# Patient Record
Sex: FEMALE | Race: WHITE | NOT HISPANIC OR LATINO | Employment: OTHER | URBAN - METROPOLITAN AREA
[De-identification: names, ages, dates, MRNs, and addresses within clinical notes are randomized per-mention and may not be internally consistent; named-entity substitution may affect disease eponyms.]

---

## 2017-07-31 ENCOUNTER — OFFICE VISIT (OUTPATIENT)
Dept: URGENT CARE | Facility: CLINIC | Age: 37
End: 2017-07-31
Payer: COMMERCIAL

## 2017-07-31 VITALS
OXYGEN SATURATION: 98 % | SYSTOLIC BLOOD PRESSURE: 132 MMHG | WEIGHT: 175 LBS | BODY MASS INDEX: 27.47 KG/M2 | RESPIRATION RATE: 18 BRPM | TEMPERATURE: 98 F | HEIGHT: 67 IN | HEART RATE: 85 BPM | DIASTOLIC BLOOD PRESSURE: 88 MMHG

## 2017-07-31 DIAGNOSIS — J30.9 ACUTE ALLERGIC RHINITIS, UNSPECIFIED SEASONALITY, UNSPECIFIED TRIGGER: ICD-10-CM

## 2017-07-31 DIAGNOSIS — J00 ACUTE NASOPHARYNGITIS: Primary | ICD-10-CM

## 2017-07-31 PROCEDURE — 96372 THER/PROPH/DIAG INJ SC/IM: CPT | Mod: S$GLB,,, | Performed by: NURSE PRACTITIONER

## 2017-07-31 PROCEDURE — 99204 OFFICE O/P NEW MOD 45 MIN: CPT | Mod: 25,S$GLB,, | Performed by: NURSE PRACTITIONER

## 2017-07-31 RX ORDER — BETAMETHASONE SODIUM PHOSPHATE AND BETAMETHASONE ACETATE 3; 3 MG/ML; MG/ML
6 INJECTION, SUSPENSION INTRA-ARTICULAR; INTRALESIONAL; INTRAMUSCULAR; SOFT TISSUE
Status: COMPLETED | OUTPATIENT
Start: 2017-07-31 | End: 2017-07-31

## 2017-07-31 RX ADMIN — BETAMETHASONE SODIUM PHOSPHATE AND BETAMETHASONE ACETATE 6 MG: 3; 3 INJECTION, SUSPENSION INTRA-ARTICULAR; INTRALESIONAL; INTRAMUSCULAR; SOFT TISSUE at 05:07

## 2017-07-31 NOTE — PROGRESS NOTES
"Subjective:       Patient ID: Oneida Cabrera is a 37 y.o. female.    Vitals:  height is 5' 7" (1.702 m) and weight is 79.4 kg (175 lb). Her temperature is 98 °F (36.7 °C). Her blood pressure is 132/88 and her pulse is 85. Her respiration is 18 and oxygen saturation is 98%.     Chief Complaint: Sinus Problem (2 days)    C/o waking up Saturday night with postnasal drip and then began to c/o intermittently productive cough, hoarse voice, sinus pressure, runny nose, watery eyes, itchy throat, sneezing, and fatigue.  No one else is at home sick.  Denies fever.  Sudafed and Muccinex taken with some relief.  Cough is not worse at night.      Sinus Problem   This is a new problem. The current episode started in the past 7 days. The problem has been gradually worsening since onset. There has been no fever. Associated symptoms include congestion, coughing, headaches (head pressure), a hoarse voice, sinus pressure and sneezing. Pertinent negatives include no chills, ear pain, neck pain, shortness of breath, sore throat or swollen glands. Treatments tried: sudafed. The treatment provided mild relief.     Review of Systems   Constitution: Positive for malaise/fatigue. Negative for chills, fever and night sweats.   HENT: Positive for congestion, headaches (head pressure), hoarse voice, sinus pressure and sneezing. Negative for ear pain and sore throat.    Eyes: Negative for discharge and redness.   Cardiovascular: Negative for chest pain, dyspnea on exertion and leg swelling.   Respiratory: Positive for cough and sputum production. Negative for hemoptysis, shortness of breath, sleep disturbances due to breathing and wheezing.    Skin: Negative for rash.   Musculoskeletal: Positive for myalgias. Negative for neck pain and stiffness.   Gastrointestinal: Positive for nausea. Negative for abdominal pain, diarrhea and vomiting.       Objective:      Physical Exam   Constitutional: She is oriented to person, place, and time. Vital " signs are normal. She appears well-developed and well-nourished. She is cooperative.  Non-toxic appearance. She has a sickly appearance. She does not appear ill. No distress.   HENT:   Head: Normocephalic and atraumatic.   Right Ear: Hearing, tympanic membrane, external ear and ear canal normal.   Left Ear: Hearing, tympanic membrane, external ear and ear canal normal.   Nose: Mucosal edema and rhinorrhea present. No nasal deformity. No epistaxis. Right sinus exhibits maxillary sinus tenderness. Right sinus exhibits no frontal sinus tenderness. Left sinus exhibits maxillary sinus tenderness. Left sinus exhibits no frontal sinus tenderness.   Mouth/Throat: Uvula is midline and mucous membranes are normal. No trismus in the jaw. Normal dentition. No uvula swelling. Posterior oropharyngeal erythema (with cobblestoning) present. No posterior oropharyngeal edema. Tonsils are 2+ on the right. Tonsils are 2+ on the left. No tonsillar exudate.   mild tenderness over bilateral maxillary sinuses, equal in intensity bilaterally   Eyes: Conjunctivae and lids are normal. No scleral icterus.   Sclera clear bilat   Neck: Trachea normal, normal range of motion, full passive range of motion without pain and phonation normal. Neck supple. No neck rigidity.   Cardiovascular: Normal rate, regular rhythm and normal pulses.    Pulmonary/Chest: Effort normal and breath sounds normal. No respiratory distress.   Abdominal: Soft. Normal appearance and bowel sounds are normal. She exhibits no distension. There is no tenderness.   Musculoskeletal: Normal range of motion. She exhibits no edema or deformity.   Lymphadenopathy:     She has no cervical adenopathy.   Neurological: She is alert and oriented to person, place, and time. She exhibits normal muscle tone. Coordination normal.   Skin: Skin is warm, dry and intact. She is not diaphoretic. No pallor.   Psychiatric: She has a normal mood and affect. Her speech is normal and behavior is  normal. Judgment and thought content normal. Cognition and memory are normal.   Nursing note and vitals reviewed.      Assessment:       1. Acute nasopharyngitis    2. Acute allergic rhinitis, unspecified seasonality, unspecified trigger        Plan:         Acute nasopharyngitis  -     betamethasone acetate-betamethasone sodium phosphate injection 6 mg; Inject 1 mL (6 mg total) into the muscle one time.    Acute allergic rhinitis, unspecified seasonality, unspecified trigger

## 2017-07-31 NOTE — PATIENT INSTRUCTIONS
"                                                         URI   If your condition worsens or fails to improve we recommend that you receive another evaluation at the ER immediately or contact your PCP to discuss your concerns or return here. You must understand that you've received an urgent care treatment only and that you may be released before all your medical problems are known or treated. You the patient will arrange for follouwp care as instructed.   If we discussed that I think your illness is viral, it will not respond to antibiotics and will last 10-14 days. If we discussed "wait and see" antibiotics and if over the next few days the symptoms worsen start the antibiotics I have given you.   If you are female and on BCP and do take the antibiotics, use additional methods to prevent pregnancy while on the antibiotics and for one cycle after.   Flonase (fluticasone) is a nasal spray which is available over the counter and may help with your symptoms.   Zyrtec D, Claritin D or Allegra D can also help with symptoms of congestion and drainage.   If you have hypertension avoid using the "D" which is the decongestant   If you just have drainage you can take plain zyrtec, claritin or allegra   If you just have a congested feeling you can take pseudoephedrine (unless you have high blood pressure) which you have to sign for behind the counter. Do not buy the phenylephrine which is on the shelf as it is not effective   Rest and fluids are also important.   Tylenol or ibuprofen can also be used as directed for pain unless you have an allergy to them or medical condition such as stomach ulcers, kidney or liver disease or blood thinners etc for which you should not be taking these type of medications.   If you are flying in the next few days Afrin nose drops for the airplane flight upon take off and landing may help. Other than at those times refrain from using afrin.   If you were prescribed a narcotic do not drive or " operate heavy machinery while taking these medications.       Adult Self-Care for Colds  Colds are caused by viruses. They cant be cured with antibiotics. However, you can relieve symptoms and support your bodys efforts to heal itself. No matter which symptoms you have, be sure to drink plenty of fluids (water or clear soup); stop smoking and drinking alcohol; and get plenty of rest.   Understand a fever  · Take your temperature several times a day. If your fever is 100.4°F (38.0°C) for more than a day, call your doctor.  · Relax, lie down. Go to bed if you want. Just get off your feet and rest. Also, drink plenty of fluids to avoid dehydration.  · Take acetaminophen or a nonsteroidal anti-inflammatory agent (NSAID), such as ibuprofen.  Treat a troubled nose kindly  · Breathe steam or heated humidified air to open blocked nasal passages.  a hot shower or use a vaporizer. Be careful not to get burned by the steam.  · Saline nasal sprays and decongestant tablets help open a stuffy nose. Antihistamines can also help, but they can cause side effects such as drowsiness and drying of the eyes, nose, and mouth.  Soothe a sore throat and cough  · Gargle every 2 hours with 1/4 teaspoon of salt dissolved in 1/2 cup of warm water. Suck on throat lozenges and cough drops to moisten your throat.  · Cough medicines are available but it is unclear how effective they actually are.  · Take acetaminophen or an NSAID, such as ibuprofen to ease throat pain  Ease digestive problems  · Put fluid back into your body. Take frequent sips of clear liquids such as water or broth. Do not drink beverages with a lot of sugar in them, such as juices and sodas. These can make diarrhea worse. Older children and adults can drink sports drinks.  · As your appetite returns, you can resume your normal diet. Ask your doctor whether there are any foods you should avoid.     When to seek medical care  When you first notice symptoms, ask your  health care provider if antiviral medications are appropriate. Antibiotics should not be taken for colds or flu. Also, call your doctor if you have any of the following symptoms or if you arent feeling better after 7 days:  · Shortness of breath  · Pain or pressure in the chest or abdomen  · Worsening symptoms, especially after a period of improvement  · Fever of 100.4°F  (38.0°C) or higher, or fever that doesnt go down with medication  · Sudden dizziness or confusion  · Severe or continued vomiting  · Signs of dehydration, including extreme thirst, dark urine, infrequent urination, dry mouth  · Spotted, red, or very sore throat   Date Last Reviewed: 6/19/2014  © 9464-7970 [employer]. [address] [place] [address], [place] [address]. All rights reserved. This information is not intended as a substitute for professional medical care. Always follow your healthcare professional's instructions.        Viral Upper Respiratory Illness (Adult)  You have a viral upper respiratory illness (URI), which is another term for the common cold. This illness is contagious during the first few days. It is spread through the air by coughing and sneezing. It may also be spread by direct contact (touching the sick person and then touching your own eyes, nose, or mouth). Frequent handwashing will decrease risk of spread. Most viral illnesses go away within 7 to 10 days with rest and simple home remedies. Sometimes the illness may last for several weeks. Antibiotics will not kill a virus, and they are generally not prescribed for this condition.    Home care  · If symptoms are severe, rest at home for the first 2 to 3 days. When you resume activity, don't let yourself get too tired.  · Avoid being exposed to cigarette smoke (yours or others).  · You may use acetaminophen or ibuprofen to control pain and fever, unless another medicine was prescribed. (Note: If you have chronic liver or kidney disease, have ever had a stomach  ulcer or gastrointestinal bleeding, or are taking blood-thinning medicines, talk with your healthcare provider before using these medicines.) Aspirin should never be given to anyone under 18 years of age who is ill with a viral infection or fever. It may cause severe liver or brain damage.  · Your appetite may be poor, so a light diet is fine. Avoid dehydration by drinking 6 to 8 glasses of fluids per day (water, soft drinks, juices, tea, or soup). Extra fluids will help loosen secretions in the nose and lungs.  · Over-the-counter cold medicines will not shorten the length of time youre sick, but they may be helpful for the following symptoms: cough, sore throat, and nasal and sinus congestion. (Note: Do not use decongestants if you have high blood pressure.)  Follow-up care  Follow up with your healthcare provider, or as advised.  When to seek medical advice  Call your healthcare provider right away if any of these occur:  · Cough with lots of colored sputum (mucus)  · Severe headache; face, neck, or ear pain  · Difficulty swallowing due to throat pain  · Fever of 100.4°F (38°C)  Call 911, or get immediate medical care  Call emergency services right away if any of these occur:  · Chest pain, shortness of breath, wheezing, or difficulty breathing  · Coughing up blood  · Inability to swallow due to throat pain  Date Last Reviewed: 9/13/2015  © 5827-4350 [employer]. [address] [place] [address]. All rights reserved. This information is not intended as a substitute for professional medical care. Always follow your healthcare professional's instructions.        Fluticasone nasal spray  What is this medicine?  FLUTICASONE (floo TIK a sone) is a corticosteroid. This medicine is used to treat the symptoms of allergies like sneezing, itchy red eyes, and itchy, runny, or stuffy nose.  How should I use this medicine?  This medicine is for use in the nose. Follow the directions on your product or  prescription label. This medicine works best if used at regular intervals. Do not use more often than directed. Make sure that you are using your nasal spray correctly. After 6 months of daily use without a prescription, talk to your doctor or health care professional before using it for a longer time. Ask your doctor or health care professional if you have any questions.  Talk to your pediatrician regarding the use of this medicine in children. Special care may be needed. This medicine has been used in children as young as 2 years. After two months of daily use without a prescription in a child, talk to your pediatrician before using it for a longer time.  What side effects may I notice from receiving this medicine?  Side effects that you should report to your doctor or health care professional as soon as possible:  · allergic reactions like skin rash, itching or hives, swelling of the face, lips, or tongue  · changes in vision  · flu-like symptoms  · white patches or sores in the mouth or nose  Side effects that usually do not require medical attention (report to your doctor or health care professional if they continue or are bothersome):  · burning or irritation inside the nose or throat  · cough  · headache  · nosebleed  · unusual taste or smell  What may interact with this medicine?  · ketoconazole  · metyrapone  · some medicines for HIV  · vaccines  What if I miss a dose?  If you miss a dose, use it as soon as you remember. If it is almost time for your next dose, use only that dose and continue with your regular schedule. Do not use double or extra doses.  Where should I keep my medicine?  Keep out of the reach of children.  Store at room temperature between 15 and 30 degrees C (59 and 86 degrees F). Throw away any unused medicine after the expiration date.  What should I tell my health care provider before I take this medicine?  They need to know if you have any of these conditions:  · infection, like  tuberculosis, herpes, or fungal infection  · recent surgery on nose or sinuses  · taking corticosteroid by mouth  · an unusual or allergic reaction to fluticasone, steroids, other medicines, foods, dyes, or preservatives  · pregnant or trying to get pregnant  · breast-feeding  What should I watch for while using this medicine?  Visit your doctor or health care professional for regular checks on your progress. Some symptoms may improve within 12 hours after starting use. Check with your doctor or health care professional if there is no improvement in your condition after 3 weeks of use.  Do not come in contact with people who have chickenpox or the measles while you are taking this medicine. If you do, call your doctor right away.  Date Last Reviewed:   NOTE:This sheet is a summary. It may not cover all possible information. If you have questions about this medicine, talk to your doctor, pharmacist, or health care provider. Copyright© 2016 [employer]

## 2018-10-04 ENCOUNTER — OFFICE VISIT (OUTPATIENT)
Dept: OPTOMETRY | Facility: CLINIC | Age: 38
End: 2018-10-04
Payer: COMMERCIAL

## 2018-10-04 DIAGNOSIS — H52.13 MYOPIA OF BOTH EYES: Primary | ICD-10-CM

## 2018-10-04 DIAGNOSIS — G51.4 EYELID MYOKYMIA: ICD-10-CM

## 2018-10-04 PROCEDURE — 92014 COMPRE OPH EXAM EST PT 1/>: CPT | Mod: S$GLB,,, | Performed by: OPTOMETRIST

## 2018-10-04 PROCEDURE — 92015 DETERMINE REFRACTIVE STATE: CPT | Mod: S$GLB,,, | Performed by: OPTOMETRIST

## 2018-10-04 PROCEDURE — 99999 PR PBB SHADOW E&M-EST. PATIENT-LVL II: CPT | Mod: PBBFAC,,, | Performed by: OPTOMETRIST

## 2018-10-04 RX ORDER — NORETHINDRONE AND ETHINYL ESTRADIOL 1 MG-35MCG
KIT ORAL
Refills: 1 | COMMUNITY
Start: 2018-09-15 | End: 2018-10-04

## 2018-10-04 NOTE — PROGRESS NOTES
HPI     Patient returns for yearly ocular health check. Pt has concerns with   twitching OD and above the right ear. Started about 3 months   intermittently, thos sx stopped a week ago. No pain just bothers.     Last edited by Pablito Agustin, OD on 10/4/2018 11:21 AM. (History)        ROS     Negative for: Constitutional, Gastrointestinal, Neurological, Skin,   Genitourinary, Musculoskeletal, HENT, Endocrine, Cardiovascular, Eyes,   Respiratory, Psychiatric, Allergic/Imm, Heme/Lymph    Last edited by Pablito Agustin, OD on 10/4/2018 10:23 AM. (History)        Assessment /Plan     For exam results, see Encounter Report.    Myopia of both eyes    Eyelid myokymia      1. Slight myopia--pt wishes new Rx (part time wearer for night driving/etc)  2. RUL myokymia which was intermittent for the past few months, tho pt reports it stopped almost a week ago and hasn't returned.  Discussed relation to stress/fatigue/caffiene.  Advised otc ZADITOR BID if it returns      PLAN:    rtc 1 yr

## 2018-10-30 ENCOUNTER — OFFICE VISIT (OUTPATIENT)
Dept: NEUROLOGY | Facility: CLINIC | Age: 38
End: 2018-10-30
Payer: COMMERCIAL

## 2018-10-30 ENCOUNTER — PATIENT MESSAGE (OUTPATIENT)
Dept: NEUROLOGY | Facility: CLINIC | Age: 38
End: 2018-10-30

## 2018-10-30 VITALS
HEART RATE: 86 BPM | HEIGHT: 67 IN | BODY MASS INDEX: 28.88 KG/M2 | SYSTOLIC BLOOD PRESSURE: 146 MMHG | DIASTOLIC BLOOD PRESSURE: 87 MMHG | WEIGHT: 184 LBS

## 2018-10-30 DIAGNOSIS — G51.39 FACIAL NERVE SPASM: Primary | ICD-10-CM

## 2018-10-30 DIAGNOSIS — G47.00 INSOMNIA, UNSPECIFIED TYPE: ICD-10-CM

## 2018-10-30 DIAGNOSIS — R25.3 MUSCLE TWITCH: ICD-10-CM

## 2018-10-30 PROCEDURE — 99204 OFFICE O/P NEW MOD 45 MIN: CPT | Mod: S$GLB,,, | Performed by: PSYCHIATRY & NEUROLOGY

## 2018-10-30 PROCEDURE — 3008F BODY MASS INDEX DOCD: CPT | Mod: CPTII,S$GLB,, | Performed by: PSYCHIATRY & NEUROLOGY

## 2018-10-30 PROCEDURE — 99999 PR PBB SHADOW E&M-EST. PATIENT-LVL III: CPT | Mod: PBBFAC,,, | Performed by: PSYCHIATRY & NEUROLOGY

## 2018-10-30 RX ORDER — FOLIC ACID 1 MG/1
1 TABLET ORAL DAILY
COMMUNITY
End: 2021-10-04

## 2018-10-30 RX ORDER — NAPROXEN SODIUM 550 MG/1
550 TABLET ORAL EVERY 8 HOURS PRN
Refills: 0 | COMMUNITY
Start: 2018-10-19 | End: 2021-10-04

## 2018-10-30 RX ORDER — CHOLECALCIFEROL (VITAMIN D3) 25 MCG
1000 TABLET ORAL DAILY
COMMUNITY
End: 2021-10-04

## 2018-10-30 RX ORDER — PNV NO.95/FERROUS FUM/FOLIC AC 28MG-0.8MG
100 TABLET ORAL DAILY
COMMUNITY
End: 2021-10-04

## 2018-10-30 RX ORDER — AA/PROT/LYSINE/METHIO/VIT C/B6 50-12.5 MG
10 TABLET ORAL DAILY
COMMUNITY
End: 2021-10-04

## 2018-10-30 NOTE — PROGRESS NOTES
"MOVEMENT DISORDERS CLINIC NEW CONSULT NOTE    PCP/Referring Provider: Nichole Carter MD  [address] [facility]  [address]  [place] [address]  Date of Service: 10/30/2018    Chief Complaint: Spasms    HPI: Oneida Villalba is a R HANDED 38 y.o. female with a PMH significant for current IVF, coming for opinion re: R eye spasm and R temporal muscle spasm. She's noticed an occasional R under eye and R temporal occasional muscle spasm, which comes and goes in under 1 second. These spasms can occur >50 times a day. At times they occur at the same time. She has not noticed R cheek or chin spasming. Spasming is not painful. It is annoyance for her. On certain days she feels like she has no spasm at all. Has no issues looking into bright light. No random eyeclosure. No fam hx tremor or dystonia.  No hx bell's palsy.    She is on hormonal therapy for IVF and a handful of "vitamins"  She has cut out coffee and spasm continue    Reports she has not been sleeping well "since age 18". Snoring nightly, however no witnessed apneic spells. Has gained weight over the last 2 years up from ideal BMI. Taking meatonin 2mg at bedtime which does not help. Gets up to urinate at times but otherwise does not know why she awakens multiple times a night.      Review of Systems:   Review of Systems   Constitutional: Negative for fever.   HENT: Negative for congestion.    Eyes: Negative for double vision.   Respiratory: Negative for cough and shortness of breath.    Cardiovascular: Negative for chest pain and leg swelling.   Gastrointestinal: Negative for nausea.   Genitourinary: Negative for dysuria.   Musculoskeletal: Negative for falls.   Skin: Negative for rash.   Neurological: Negative for tingling, tremors, sensory change, speech change, focal weakness, loss of consciousness and headaches.   Psychiatric/Behavioral: Negative for depression. The patient has insomnia.          Current Medications:  Outpatient Encounter Medications as of 10/30/2018 "   Medication Sig Dispense Refill    AFLURIA QUAD 7350-5366, PF, 60 mcg/0.5 mL vaccine ADM 0.5ML IM UTD  0    ascorbic acid, vitamin C, (VITAMIN C) 100 MG tablet Take 100 mg by mouth once daily.      coenzyme Q10 (CO Q-10) 10 mg capsule Take 10 mg by mouth once daily.      cyanocobalamin (VITAMIN B-12) 100 MCG tablet Take 100 mcg by mouth once daily.      docosahexanoic acid (DHA PRENATAL ORAL) Take by mouth.      folic acid (FOLVITE) 1 MG tablet Take 1 mg by mouth once daily.      MAGNESIUM CITRATE ORAL Take by mouth.      naproxen sodium (ANAPROX) 550 MG tablet Take 550 mg by mouth every 8 (eight) hours as needed.  0    prasterone, DHEA, (DHEA ORAL) Take by mouth.      prenatal 25/iron fum/folic/dha (PRENATAL-1 ORAL) Take by mouth.      vitamin D (VITAMIN D3) 1000 units Tab Take 1,000 Units by mouth once daily.       No facility-administered encounter medications on file as of 10/30/2018.        Past Medical History:  Past Medical History:   Diagnosis Date    Heart murmur        Past Surgical History:  None  Current Living Situation: Home with     Social:  Social History     Socioeconomic History    Marital status:      Spouse name: Not on file    Number of children: Not on file    Years of education: Not on file    Highest education level: Not on file   Social Needs    Financial resource strain: Not on file    Food insecurity - worry: Not on file    Food insecurity - inability: Not on file    Transportation needs - medical: Not on file    Transportation needs - non-medical: Not on file   Occupational History    Not on file   Tobacco Use    Smoking status: Former Smoker     Last attempt to quit: 12/10/2003     Years since quittin.8    Smokeless tobacco: Never Used   Substance and Sexual Activity    Alcohol use: Yes    Drug use: No    Sexual activity: Not on file   Other Topics Concern    Not on file   Social History Narrative    Not on file       Family  "History:  Family History   Problem Relation Age of Onset    No Known Problems Mother     No Known Problems Father     No Known Problems Sister     No Known Problems Brother     No Known Problems Maternal Aunt     No Known Problems Maternal Uncle     No Known Problems Paternal Aunt     No Known Problems Paternal Uncle     No Known Problems Maternal Grandmother     No Known Problems Maternal Grandfather     Cancer Paternal Grandmother     No Known Problems Paternal Grandfather     Amblyopia Neg Hx     Blindness Neg Hx     Cataracts Neg Hx     Glaucoma Neg Hx     Macular degeneration Neg Hx     Retinal detachment Neg Hx     Strabismus Neg Hx     Diabetes Neg Hx     Hypertension Neg Hx     Stroke Neg Hx     Thyroid disease Neg Hx        PHYSICAL:  BP (!) 146/87   Pulse 86   Ht 5' 7" (1.702 m)   Wt 83.5 kg (184 lb)   BMI 28.82 kg/m²     General Medical Examination:  General: The patient is alert and cooperative with the exam.   HEENT: Normocephalic, atraumatic.   Neck: Supple.   Chest: Unlabored breathing.   CV: Symmetric pulses.   Ext: No clubbing, cyanosis, or edema.     Mental Status:  General: Good hygiene, appropriate appearance.  Mood/Affect: Appropriate/congruent.  Level of consciousness: Awake, alert.  Orientation: Oriented to person, place, time and situation.  Language: No Dysarthria    Cranial nerves:  I: Not tested  II: PERRL, VFF to counting  III, IV, VI: EOMI with conjugate gaze and no nystagmus on end gaze  V: Facial sensation intact and symmetric over the bilateral V1-V3  VII: Facial muscle activation intact and symmetric over the bilateral upper and lower face  VIII: Hearing intact in the b/l ears and symmetrical to finger rub  IX, X, XII: TUP midline  X: SCMs and shoulder shrug full strength b/l and symmetric    At times R bottom lid line irregular very mild spasming.    Motor:   Strength Intact throughout upper and lower extremities, 5/5.      DTRs:  ? Biceps Triceps " Brachioradialis Knee Ankle   Left 2+ 2+ 2+ 2+ 2+   Right 2+ 2+ 2+ 2+ 2+   -Plantar reflex: down    ? Finger taps Finger flicks ANUSHKA Heel taps   Left nl nl nl nl   Right nl nl nl nl   Neck tone: nlnl  ? Arm Leg   Left nl nl   Right nl nl     Sensation:   -Light touch: Intact and symmetric in the bilateral upper and lower extremities.    Coordination:   -Finger to nose: nl  -Heel to shin: nl    Gait:  -Arises from chair withnl use of hands.  -Casual gait is: nl based  -Arm Swing: nl  -Turning: nl  -Heel walking: nl  -Toe walking: nl  -Tandem gait: nl      Laboratory Data:  NA    Imaging:  NA    Assessment//Plan:   Problem List Items Addressed This Visit     None      Visit Diagnoses     Facial nerve spasm    -  Primary    Relevant Orders    TSH    Ammonia    Magnesium    Calcium, ionized    Comprehensive metabolic panel          Follow-up: 3 mos    Tiffany Wall MD, MS [facility]  Department of Neurology  Movement Disorders

## 2018-10-30 NOTE — LETTER
October 30, 2018      Nichole Carter MD  [address] [facility] [facility] [address] [facility]  [address]  [facility] [address]           [facility]  [address]  [place] [address]  Phone: [phone]  Fax: [phone]          Patient: Oneida Villalba   MR Number: [id number]   YOB: 1980   Date of Visit: 10/30/2018       Dear Dr. Nichole Carter:    Thank you for referring Oneida Villalba to me for evaluation. Attached you will find relevant portions of my assessment and plan of care.    If you have questions, please do not hesitate to call me. I look forward to following Oneida Villalba along with you.    Sincerely,    Tiffany Wall MD    Enclosure  CC:  No Recipients    If you would like to receive this communication electronically, please contact [email or web address] or [phone] to request more information on [employer] access.    For providers and/or their staff who would like to refer a patient to [facility], please contact us through our one-stop-shop provider referral line, [facility], at [phone].    If you feel you have received this communication in error or would no longer like to receive these types of communications, please e-mail [email or web address]

## 2018-10-30 NOTE — ASSESSMENT & PLAN NOTE
Trouble falling asleep and staying asleep since teens. Recent weight gain and snoring worrisome for sleep apnea. This at times can cause muscle spasms and tremors. Suggested a sleep study and increase melatonin to 5mg QHS

## 2018-10-30 NOTE — ASSESSMENT & PLAN NOTE
Muscle spasm, brief, benign. No hemifacial spasm distribution. No synkinesis on exam or other spasming to suggest hemifacial spasm. Her spasm in other unrelated nerve distrubution suggests a generalize muscle twitching brought on by lack of sleep or electrolyte disturbance. Will f/u labs, sleep study.

## 2019-10-10 ENCOUNTER — TELEPHONE (OUTPATIENT)
Dept: ORTHOPEDICS | Facility: CLINIC | Age: 39
End: 2019-10-10

## 2019-10-10 NOTE — TELEPHONE ENCOUNTER
Contacted patient regarding laterality of her hip pain for upcoming appointment. Patient confirmed her pain is affecting her left hip. Patient expressed understanding of her upcoming appointment date, time and location.

## 2019-10-15 ENCOUNTER — OFFICE VISIT (OUTPATIENT)
Dept: SPORTS MEDICINE | Facility: CLINIC | Age: 39
End: 2019-10-15
Payer: COMMERCIAL

## 2019-10-15 ENCOUNTER — HOSPITAL ENCOUNTER (OUTPATIENT)
Dept: RADIOLOGY | Facility: HOSPITAL | Age: 39
Discharge: HOME | End: 2019-10-15
Attending: ORTHOPAEDIC SURGERY
Payer: COMMERCIAL

## 2019-10-15 VITALS
SYSTOLIC BLOOD PRESSURE: 123 MMHG | DIASTOLIC BLOOD PRESSURE: 78 MMHG | HEIGHT: 67 IN | HEART RATE: 70 BPM | BODY MASS INDEX: 28.82 KG/M2

## 2019-10-15 DIAGNOSIS — M99.04 SOMATIC DYSFUNCTION OF SACRAL REGION: ICD-10-CM

## 2019-10-15 DIAGNOSIS — M25.552 LEFT HIP PAIN: ICD-10-CM

## 2019-10-15 DIAGNOSIS — M62.81 MUSCLE WEAKNESS: ICD-10-CM

## 2019-10-15 DIAGNOSIS — M99.05 SOMATIC DYSFUNCTION OF PELVIS REGION: ICD-10-CM

## 2019-10-15 DIAGNOSIS — M79.10 MYALGIA: ICD-10-CM

## 2019-10-15 DIAGNOSIS — M99.03 SOMATIC DYSFUNCTION OF LUMBAR REGION: ICD-10-CM

## 2019-10-15 DIAGNOSIS — M25.552 LEFT HIP PAIN: Primary | ICD-10-CM

## 2019-10-15 DIAGNOSIS — M99.06 SOMATIC DYSFUNCTION OF LOWER EXTREMITY: ICD-10-CM

## 2019-10-15 DIAGNOSIS — M76.32 IT BAND SYNDROME, LEFT: ICD-10-CM

## 2019-10-15 DIAGNOSIS — M99.02 SOMATIC DYSFUNCTION OF THORACIC REGION: ICD-10-CM

## 2019-10-15 PROCEDURE — 73502 XR HIP 2 VIEW LEFT: ICD-10-PCS | Mod: 26,LT,, | Performed by: RADIOLOGY

## 2019-10-15 PROCEDURE — 99204 PR OFFICE/OUTPT VISIT, NEW, LEVL IV, 45-59 MIN: ICD-10-PCS | Mod: 25,S$GLB,, | Performed by: ORTHOPAEDIC SURGERY

## 2019-10-15 PROCEDURE — 99999 PR PBB SHADOW E&M-EST. PATIENT-LVL III: CPT | Mod: PBBFAC,,, | Performed by: ORTHOPAEDIC SURGERY

## 2019-10-15 PROCEDURE — 3008F PR BODY MASS INDEX (BMI) DOCUMENTED: ICD-10-PCS | Mod: CPTII,S$GLB,, | Performed by: ORTHOPAEDIC SURGERY

## 2019-10-15 PROCEDURE — 98927 PR OSTEOPATHIC MANIP,5-6 BODY REGN: ICD-10-PCS | Mod: S$GLB,,, | Performed by: ORTHOPAEDIC SURGERY

## 2019-10-15 PROCEDURE — 3008F BODY MASS INDEX DOCD: CPT | Mod: CPTII,S$GLB,, | Performed by: ORTHOPAEDIC SURGERY

## 2019-10-15 PROCEDURE — 99999 PR PBB SHADOW E&M-EST. PATIENT-LVL III: ICD-10-PCS | Mod: PBBFAC,,, | Performed by: ORTHOPAEDIC SURGERY

## 2019-10-15 PROCEDURE — 73502 X-RAY EXAM HIP UNI 2-3 VIEWS: CPT | Mod: TC,LT

## 2019-10-15 PROCEDURE — 98927 OSTEOPATH MANJ 5-6 REGIONS: CPT | Mod: S$GLB,,, | Performed by: ORTHOPAEDIC SURGERY

## 2019-10-15 PROCEDURE — 73502 X-RAY EXAM HIP UNI 2-3 VIEWS: CPT | Mod: 26,LT,, | Performed by: RADIOLOGY

## 2019-10-15 PROCEDURE — 99204 OFFICE O/P NEW MOD 45 MIN: CPT | Mod: 25,S$GLB,, | Performed by: ORTHOPAEDIC SURGERY

## 2019-10-15 NOTE — PROGRESS NOTES
CC: left hip pain    39 y.o. Female presents today for evaluation of her left hip pain. She reports she is unable to recall a specific mechanism of injury to her left hip. 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 She indicates her pain affects the lateral aspect of her left thigh and will affect her low back and feels as if it is deep. She states her pain is intermittent and the quality of her pain is sharp. She will also experience a dull constant ache and tightness. She denies falls in the last 6-12 months.   How long: Patient reports hip pain for approximately 5-6 years.  What makes it better: Patient indicates she will have short term pain relief with seeing a chiropractor.   What makes it worse: Patient reports increased pain while wearing high heels, walking long distances and with laying on her left side.   Does it radiate: Patient reports radiating pain distally to her knee.   Attempted treatments: Patient reports she will take OTC NSAIDs as needed. She has also seen a chiropractor for this problem which has provided her with short term pain relief. She reports she has tried massage which has provided relief. She denies ice or heat.   Pain score: 3/10  Any mechanical symptoms: Denies mechanical symptoms.   Feelings of instability: Denies feelings of instability.   Affecting ADLs: She reports this problem is not affecting her ability to perform ADLs. 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     REVIEW OF SYSTEMS:   Constitution: Patient denies fever, chills, night sweats, and weight changes.  Eyes: Patient denies eye pain or vision changes.  HENT: Patient denies headache, ear pain, sore throat, or nasal discharge.  CVS: Patient denies chest pain.  Lungs: Patient denies shortness of breath or cough.  Abd: Patient denies stomach pain, nausea, or vomiting.  Skin: Patient denies skin rash or  itching.    Hematologic/Lymphatic: Patient denies easy bruising.   Musculoskeletal: Patient denies recent falls. See HPI.  Psych: Patient denies any current anxiety or nervousness.    PAST MEDICAL HISTORY:   Past Medical History:   Diagnosis Date    Heart murmur        PAST SURGICAL HISTORY:   History reviewed. No pertinent surgical history.    FAMILY HISTORY:   Family History   Problem Relation Age of Onset    No Known Problems Mother     No Known Problems Father     No Known Problems Sister     No Known Problems Brother     No Known Problems Maternal Aunt     No Known Problems Maternal Uncle     No Known Problems Paternal Aunt     No Known Problems Paternal Uncle     No Known Problems Maternal Grandmother     No Known Problems Maternal Grandfather     Cancer Paternal Grandmother     No Known Problems Paternal Grandfather     Amblyopia Neg Hx     Blindness Neg Hx     Cataracts Neg Hx     Glaucoma Neg Hx     Macular degeneration Neg Hx     Retinal detachment Neg Hx     Strabismus Neg Hx     Diabetes Neg Hx     Hypertension Neg Hx     Stroke Neg Hx     Thyroid disease Neg Hx        SOCIAL HISTORY:   Social History     Socioeconomic History    Marital status:      Spouse name: Not on file    Number of children: Not on file    Years of education: Not on file    Highest education level: Not on file   Occupational History    Not on file   Social Needs    Financial resource strain: Not on file    Food insecurity:     Worry: Not on file     Inability: Not on file    Transportation needs:     Medical: Not on file     Non-medical: Not on file   Tobacco Use    Smoking status: Former Smoker     Last attempt to quit: 12/10/2003     Years since quitting: 15.8    Smokeless tobacco: Never Used   Substance and Sexual Activity    Alcohol use: Yes    Drug use: No    Sexual activity: Not on file   Lifestyle    Physical activity:     Days per week: Not on file     Minutes per session: Not on  "file    Stress: Not on file   Relationships    Social connections:     Talks on phone: Not on file     Gets together: Not on file     Attends Jain service: Not on file     Active member of club or organization: Not on file     Attends meetings of clubs or organizations: Not on file     Relationship status: Not on file   Other Topics Concern    Not on file   Social History Narrative    Not on file       MEDICATIONS:     Current Outpatient Medications:     AFLURIA QUAD 3537-4531, PF, 60 mcg/0.5 mL vaccine, ADM 0.5ML IM UTD, Disp: , Rfl: 0    folic acid (FOLVITE) 1 MG tablet, Take 1 mg by mouth once daily., Disp: , Rfl:     prenatal 25/iron fum/folic/dha (PRENATAL-1 ORAL), Take by mouth., Disp: , Rfl:     ascorbic acid, vitamin C, (VITAMIN C) 100 MG tablet, Take 100 mg by mouth once daily., Disp: , Rfl:     coenzyme Q10 (CO Q-10) 10 mg capsule, Take 10 mg by mouth once daily., Disp: , Rfl:     cyanocobalamin (VITAMIN B-12) 100 MCG tablet, Take 100 mcg by mouth once daily., Disp: , Rfl:     docosahexanoic acid (DHA PRENATAL ORAL), Take by mouth., Disp: , Rfl:     MAGNESIUM CITRATE ORAL, Take by mouth., Disp: , Rfl:     naproxen sodium (ANAPROX) 550 MG tablet, Take 550 mg by mouth every 8 (eight) hours as needed., Disp: , Rfl: 0    prasterone, DHEA, (DHEA ORAL), Take by mouth., Disp: , Rfl:     vitamin D (VITAMIN D3) 1000 units Tab, Take 1,000 Units by mouth once daily., Disp: , Rfl:     ALLERGIES:   Review of patient's allergies indicates:  No Known Allergies     PHYSICAL EXAMINATION:  /78   Pulse 70   Ht 5' 7" (1.702 m)   BMI 28.82 kg/m²   Vitals signs and nursing note have been reviewed.  General: In no acute distress, well developed, well nourished, no diaphoresis  Eyes: EOM full and smooth, no eye redness or discharge  HENT: normocephalic and atraumatic, neck supple, trachea midline, no nasal discharge, no external ear redness or discharge  Cardiovascular: no LE edema  Lungs: " respirations non-labored, no conversational dyspnea   Abd: non-distended, no rigidity  MSK: no amputation or deformity, no swelling of extremities  Neuro: AAOx3, CN2-12 grossly intact  Skin: No rashes, warm and dry  Psychiatric: cooperative, pleasant, mood and affect appropriate for age    HIP: LEFT  The affected hip is compared to the contralateral hip.    Observation:    There is no edema, erythema, or ecchymosis in the lumbosacral region.   There is no Trendelenburg sign on either side  No obvious pelvic obliquity while standing.    No thoracolumbar scoliosis observed.    No midline skin abnormalities.  No atrophy noted in the lower limbs.    ROM:   Passive hip flexion to 120° on left and 120° on right.    Passive hip internal rotation to 45° on left and 45° on right.    Passive hip external rotation to 45° on left and 45° on right.    Passive hip abduction to 45° on left and 45° on right.    All motions above are without pain.    Tenderness To Palpation:  No tenderness at the ASIS, AIIS, PSIS, PIIS, iliac crest, pubic bones, ischial tuberosity.  No tenderness throughout the lumbar spine, iliolumbar region, or posterior pelvis.  No tenderness throughout the sacrum or piriformis  No tenderness over the greater trochanteric bursa or greater/lesser trochanters.  + tenderness at the glut attachments on the greater trochanter  + tenderness over proximal IT band or hip flexor musculature.    Strength Testing:  Hip flexion - 5/5 on left and 5/5 on right  Hip extension - 5/5 on left and 5/5 on right  Hip adduction - 5/5 on left and 5/5 on right  Hip abduction - 5/5 on left and 5/5 on right  Knee flexion - 5/5 on left and 5/5 on right  Knee extension - 5/5 on left and 5/5 on right    Special Tests:  Standing Trendelenburg test - negative    Seated straight leg raise - negative  Supine straight leg raise - negative  Hamstring flexibility symmetric    Log roll - negative  CHOCO - mild pain at lateral hip  FADIR -  negative  Scour test - negative  No pain with posterior hip capsule compression    ASIS compression test - negative  SI drawer test - negative   Thigh thrust test - negative     Dino test reveals tight iliopsoas and IT band.  Quadriceps flexibility symmetric.    Bettye test - positive for tightness  Moshe compression test - negative    Fulcrum test - negative      Posture:  Upright and Increased thoracic kyphosis  Gait: Non-antalgic with Neutral ankle mechanics     TART (Tissue texture abnormality, Asymmetry,  Restriction of motion and/or Tenderness) changes:     Cervical Spine  Thoracic Spine  Lumbar Spine   C1  T1  L1 TTAL   C2  T2  L2 TTAL   C3  T3  L3 TTAL   C4  T4  L4 ERSL   C5  T5  L5 Neutral   C6  T6      C7  T7 Neutral       T8 Neutral       T9 Neutral       T10 TTAL       T11 TTAL       T12 TTAL       Pelvis:  · Innominate:Left anterior rotation  · Pubic bone:Left interior pubic shear    Sacrum:Right on Right sacral torsion    Lower extremity:  Fascial herniated trigger point proximal third of IT band on the left  Myofascial restriction left lateral upper leg  Fascial trigger band from mid IT band to attachment at knee along lateral aspect on the left    Key   F= Flexed   E = Extended   R = Rotated   S = Sidebent   TTA = tissue texture abnormality       Neurovascular Exam:  Normal gait without Trendelenburg.  2+ femoral, DP, and PT pulses BL.  No skin changes, no abnormal hair distribution.  Sensation intact to light touch throughout the obturator and medial/lateral/posterior femoral cutaneous nerves.  Capillary refill intact to <2 seconds in all lower limb digits.      IMAGIN. X-ray obtained 10/15/2019 due to left hip pain  2. X-ray images were reviewed personally by me and then directly with patient.  3. FINDINGS: X-ray images obtained demonstrate no cortical irregularities, sclerosis, osteophyte formation, or subchonral cysts. There is no joint space narrowing.  4. IMPRESSION: No pathology or  irregularities appreciated.       ASSESSMENT:      ICD-10-CM ICD-9-CM   1. Left hip pain M25.552 719.45   2. It band syndrome, left M76.32 728.89   3. Muscle weakness M62.81 728.87   4. Myalgia M79.10 729.1   5. Somatic dysfunction of lumbar region M99.03 739.3   6. Somatic dysfunction of pelvis region M99.05 739.5   7. Somatic dysfunction of lower extremity M99.06 739.6   8. Somatic dysfunction of sacral region M99.04 739.4   9. Somatic dysfunction of thoracic region M99.02 739.2         PLAN:  1-4.  Left hip pain/IT band syndrome/ muscle weakness/myalgia -     - Oneida admits to left lateral hip pain that comes and goes for the past 5 years.  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  She is able to do her normal activities, but feels like her gait and body is off.    - XRs ordered in the office today and images were personally reviewed with the patient. See above for further detail.    - Based on her description/body language of pain and somatic dysfunction identified on exam, I discussed osteopathic manipulation as a treatment option today.  She consents to evaluation and treatment.  See below.    - Patient was provided with IT band stretching, glute shira and glute medius retraining handouts. She was encouraged to perform these exercises daily. All exercises were explained with the patient and she expressed how to correctly perform them.      5-9.  Somatic dysfunction of lumbar, pelvis, lower extremity, sacral, thoracic regions -     - OMT 3-4 regions. Oral consent obtained. Reviewed benefits and potential side effects. OMT indicated today due to signs and symptoms as well as local and remote somatic dysfunction findings and their related neurokinetic, lymphatic, fascial and/or arteriovenous body connections. OMT techniques used: Soft Tissue, Myofascial Release, Muscle Energy and Fascial Distortion Model. Treatment was tolerated well. Improvement noted in  segmental mobility post-treatment in dysfunctional regions. There were no adverse events and no complications immediately following treatment. Advised plenty of water to help alleviate soreness.      Future planning includes - possibly more OMT if helpful and if indicated, possibly PT if not improving    All questions were answered to the best of my ability and all concerns were addressed at this time.    Follow up in 2 weeks for above, or sooner if needed.      This note is dictated using the M*Modal Fluency Direct word recognition program. There are word recognition mistakes that are occasionally missed on review.

## 2019-12-03 ENCOUNTER — OFFICE VISIT (OUTPATIENT)
Dept: SPORTS MEDICINE | Facility: CLINIC | Age: 39
End: 2019-12-03
Payer: COMMERCIAL

## 2019-12-03 VITALS
BODY MASS INDEX: 28.88 KG/M2 | HEIGHT: 67 IN | SYSTOLIC BLOOD PRESSURE: 111 MMHG | DIASTOLIC BLOOD PRESSURE: 73 MMHG | HEART RATE: 69 BPM | WEIGHT: 184 LBS

## 2019-12-03 DIAGNOSIS — M99.04 SOMATIC DYSFUNCTION OF SACRAL REGION: ICD-10-CM

## 2019-12-03 DIAGNOSIS — M76.32 IT BAND SYNDROME, LEFT: ICD-10-CM

## 2019-12-03 DIAGNOSIS — M99.02 SOMATIC DYSFUNCTION OF THORACIC REGION: ICD-10-CM

## 2019-12-03 DIAGNOSIS — M99.05 SOMATIC DYSFUNCTION OF PELVIS REGION: ICD-10-CM

## 2019-12-03 DIAGNOSIS — M99.03 SOMATIC DYSFUNCTION OF LUMBAR REGION: ICD-10-CM

## 2019-12-03 DIAGNOSIS — M79.10 MYALGIA: ICD-10-CM

## 2019-12-03 DIAGNOSIS — M25.552 LEFT HIP PAIN: Primary | ICD-10-CM

## 2019-12-03 DIAGNOSIS — M99.06 SOMATIC DYSFUNCTION OF LOWER EXTREMITY: ICD-10-CM

## 2019-12-03 PROCEDURE — 99999 PR PBB SHADOW E&M-EST. PATIENT-LVL III: ICD-10-PCS | Mod: PBBFAC,,, | Performed by: ORTHOPAEDIC SURGERY

## 2019-12-03 PROCEDURE — 99213 PR OFFICE/OUTPT VISIT, EST, LEVL III, 20-29 MIN: ICD-10-PCS | Mod: 25,S$GLB,, | Performed by: ORTHOPAEDIC SURGERY

## 2019-12-03 PROCEDURE — 3008F BODY MASS INDEX DOCD: CPT | Mod: CPTII,S$GLB,, | Performed by: ORTHOPAEDIC SURGERY

## 2019-12-03 PROCEDURE — 3008F PR BODY MASS INDEX (BMI) DOCUMENTED: ICD-10-PCS | Mod: CPTII,S$GLB,, | Performed by: ORTHOPAEDIC SURGERY

## 2019-12-03 PROCEDURE — 98927 PR OSTEOPATHIC MANIP,5-6 BODY REGN: ICD-10-PCS | Mod: S$GLB,,, | Performed by: ORTHOPAEDIC SURGERY

## 2019-12-03 PROCEDURE — 99999 PR PBB SHADOW E&M-EST. PATIENT-LVL III: CPT | Mod: PBBFAC,,, | Performed by: ORTHOPAEDIC SURGERY

## 2019-12-03 PROCEDURE — 99213 OFFICE O/P EST LOW 20 MIN: CPT | Mod: 25,S$GLB,, | Performed by: ORTHOPAEDIC SURGERY

## 2019-12-03 PROCEDURE — 98927 OSTEOPATH MANJ 5-6 REGIONS: CPT | Mod: S$GLB,,, | Performed by: ORTHOPAEDIC SURGERY

## 2019-12-03 NOTE — PROGRESS NOTES
CC: left hip pain    Ms. Villalba is here today for follow up evaluation of her left hip pain. Patient reports she believes she is still experiencing benefits following OMT and she rates her pain at 2/10 today. When asked where she is feeling her pain today she gestures to her left glute and also reports her IT band has been feeling tight as well. She admits to left foot pain for the last two weeks which she believes may be related to her hip pain. Patient reports she was compliant with her HEP 5-6 times per week, but states she is not doing them as frequently now. She denies falls or trauma since her last visit.     Recall from visit on 10/15/2019  39 y.o. Female presents today for evaluation of her left hip pain. She reports she is unable to recall a specific mechanism of injury to her left hip. 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 She indicates her pain affects the lateral aspect of her left thigh and will affect her low back and feels as if it is deep. She states her pain is intermittent and the quality of her pain is sharp. She will also experience a dull constant ache and tightness. She denies falls in the last 6-12 months.   How long: Patient reports hip pain for approximately 5-6 years.  What makes it better: Patient indicates she will have short term pain relief with seeing a chiropractor.   What makes it worse: Patient reports increased pain while wearing high heels, walking long distances and with laying on her left side.   Does it radiate: Patient reports radiating pain distally to her knee.   Attempted treatments: Patient reports she will take OTC NSAIDs as needed. She has also seen a chiropractor for this problem which has provided her with short term pain relief. She reports she has tried massage which has provided relief. She denies ice or heat.   Pain score: 3/10  Any mechanical symptoms: Denies mechanical symptoms.   Feelings of  instability: Denies feelings of instability.   Affecting ADLs: She reports this problem is not affecting her ability to perform ADLs. 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     REVIEW OF SYSTEMS:   Constitution: Patient denies fever, chills, night sweats, and weight changes.  Eyes: Patient denies eye pain or vision changes.  HENT: Patient denies headache, ear pain, sore throat, or nasal discharge.  CVS: Patient denies chest pain.  Lungs: Patient denies shortness of breath or cough.  Abd: Patient denies stomach pain, nausea, or vomiting.  Skin: Patient denies skin rash or itching.    Hematologic/Lymphatic: Patient denies easy bruising.   Musculoskeletal: Patient denies recent falls. See HPI.  Psych: Patient denies any current anxiety or nervousness.    PAST MEDICAL HISTORY:   Past Medical History:   Diagnosis Date    Heart murmur        PAST SURGICAL HISTORY:   History reviewed. No pertinent surgical history.    FAMILY HISTORY:   Family History   Problem Relation Age of Onset    No Known Problems Mother     No Known Problems Father     No Known Problems Sister     No Known Problems Brother     No Known Problems Maternal Aunt     No Known Problems Maternal Uncle     No Known Problems Paternal Aunt     No Known Problems Paternal Uncle     No Known Problems Maternal Grandmother     No Known Problems Maternal Grandfather     Cancer Paternal Grandmother     No Known Problems Paternal Grandfather     Amblyopia Neg Hx     Blindness Neg Hx     Cataracts Neg Hx     Glaucoma Neg Hx     Macular degeneration Neg Hx     Retinal detachment Neg Hx     Strabismus Neg Hx     Diabetes Neg Hx     Hypertension Neg Hx     Stroke Neg Hx     Thyroid disease Neg Hx        SOCIAL HISTORY:   Social History     Socioeconomic History    Marital status:      Spouse name: Not on file    Number of children: Not on file    Years of education: Not on file     Highest education level: Not on file   Occupational History    Not on file   Social Needs    Financial resource strain: Not on file    Food insecurity:     Worry: Not on file     Inability: Not on file    Transportation needs:     Medical: Not on file     Non-medical: Not on file   Tobacco Use    Smoking status: Former Smoker     Last attempt to quit: 12/10/2003     Years since quitting: 15.9    Smokeless tobacco: Never Used   Substance and Sexual Activity    Alcohol use: Yes    Drug use: No    Sexual activity: Not on file   Lifestyle    Physical activity:     Days per week: Not on file     Minutes per session: Not on file    Stress: Not on file   Relationships    Social connections:     Talks on phone: Not on file     Gets together: Not on file     Attends Hoahaoism service: Not on file     Active member of club or organization: Not on file     Attends meetings of clubs or organizations: Not on file     Relationship status: Not on file   Other Topics Concern    Not on file   Social History Narrative    Not on file       MEDICATIONS:     Current Outpatient Medications:     AFLURIA QUAD 1371-1789, PF, 60 mcg/0.5 mL vaccine, ADM 0.5ML IM UTD, Disp: , Rfl: 0    ascorbic acid, vitamin C, (VITAMIN C) 100 MG tablet, Take 100 mg by mouth once daily., Disp: , Rfl:     coenzyme Q10 (CO Q-10) 10 mg capsule, Take 10 mg by mouth once daily., Disp: , Rfl:     cyanocobalamin (VITAMIN B-12) 100 MCG tablet, Take 100 mcg by mouth once daily., Disp: , Rfl:     docosahexanoic acid (DHA PRENATAL ORAL), Take by mouth., Disp: , Rfl:     folic acid (FOLVITE) 1 MG tablet, Take 1 mg by mouth once daily., Disp: , Rfl:     MAGNESIUM CITRATE ORAL, Take by mouth., Disp: , Rfl:     naproxen sodium (ANAPROX) 550 MG tablet, Take 550 mg by mouth every 8 (eight) hours as needed., Disp: , Rfl: 0    prasterone, DHEA, (DHEA ORAL), Take by mouth., Disp: , Rfl:     prenatal 25/iron fum/folic/dha (PRENATAL-1 ORAL), Take by mouth.,  "Disp: , Rfl:     vitamin D (VITAMIN D3) 1000 units Tab, Take 1,000 Units by mouth once daily., Disp: , Rfl:     ALLERGIES:   Review of patient's allergies indicates:  No Known Allergies     PHYSICAL EXAMINATION:  /73   Pulse 69   Ht 5' 7" (1.702 m)   Wt 83.5 kg (184 lb)   BMI 28.82 kg/m²   Vitals signs and nursing note have been reviewed.  General: In no acute distress, well developed, well nourished, no diaphoresis  Eyes: EOM full and smooth, no eye redness or discharge  HENT: normocephalic and atraumatic, neck supple, trachea midline, no nasal discharge, no external ear redness or discharge  Cardiovascular: no LE edema  Lungs: respirations non-labored, no conversational dyspnea   Abd: non-distended, no rigidity  MSK: no amputation or deformity, no swelling of extremities  Neuro: AAOx3, CN2-12 grossly intact  Skin: No rashes, warm and dry  Psychiatric: cooperative, pleasant, mood and affect appropriate for age    HIP: LEFT  The affected hip is compared to the contralateral hip.    Observation:    There is no edema, erythema, or ecchymosis in the lumbosacral region.   There is no Trendelenburg sign on either side  No obvious pelvic obliquity while standing.    No thoracolumbar scoliosis observed.    No midline skin abnormalities.  No atrophy noted in the lower limbs.    ROM:   Passive hip flexion to 120° on left and 120° on right.    Passive hip internal rotation to 45° on left and 45° on right.    Passive hip external rotation to 45° on left and 45° on right.    Passive hip abduction to 45° on left and 45° on right.    All motions above are without pain.      Tenderness To Palpation:  No tenderness at the ASIS, AIIS, PSIS, PIIS, iliac crest, pubic bones, ischial tuberosity.  No tenderness throughout the lumbar spine, iliolumbar region, or posterior pelvis.  No tenderness throughout the sacrum or piriformis  No tenderness over the greater trochanteric bursa or greater/lesser trochanters.  + tenderness at " the glut attachments on the greater trochanter  + tenderness over proximal IT band or hip flexor musculature.    Strength Testing:  Hip flexion - 5/5 on left and 5/5 on right  Hip extension - 5/5 on left and 5/5 on right  Hip adduction - 5/5 on left and 5/5 on right  Hip abduction - 5/5 on left and 5/5 on right  Knee flexion - 5/5 on left and 5/5 on right  Knee extension - 5/5 on left and 5/5 on right    Special Tests:  Standing Trendelenburg test - negative    Seated straight leg raise - negative  Supine straight leg raise - negative  Hamstring flexibility symmetric    Log roll - negative  CHOCO - negative  FADIR - negative  No pain with posterior hip capsule compression    ASIS compression test - negative  SI drawer test - negative   Thigh thrust test - negative     Dino test reveals tight iliopsoas and IT band.  Quadriceps flexibility symmetric.    Bettye test - negative  Moshe compression test - negative    Fulcrum test - negative      Posture:  Upright and Increased thoracic kyphosis  Gait: Non-antalgic with Neutral ankle mechanics     TART (Tissue texture abnormality, Asymmetry,  Restriction of motion and/or Tenderness) changes:     Cervical Spine  Thoracic Spine  Lumbar Spine   C1  T1  L1 Neutral   C2  T2  L2 Neutral   C3  T3  L3 ERSL   C4  T4 Neutral    L4 Neutral   C5  T5 Neutral    L5 ERSL   C6  T6 Neutral        C7  T7 Neutral       T8 TTAR       T9 TTAR       T10 ERSR       T11 FRSR       T12 FRSR       Pelvis:  · Innominate:Left anterior rotation  · Pubic bone:Neutral    Sacrum:Left on Right sacral torsion    Lower extremity:  Fascial herniated trigger point proximal third of IT band on the left  Myofascial restriction left upper leg  Fascial trigger band from mid IT band to attachment at knee along lateral aspect on the left  Joint restrictions with associated TTA in the midfoot bones on the left    Key   F= Flexed   E = Extended   R = Rotated   S = Sidebent   TTA = tissue texture abnormality        Neurovascular Exam:  Normal gait without Trendelenburg.  2+ femoral, DP, and PT pulses BL.  No skin changes, no abnormal hair distribution.  Sensation intact to light touch throughout the obturator and medial/lateral/posterior femoral cutaneous nerves.  Capillary refill intact to <2 seconds in all lower limb digits.      IMAGIN. X-ray obtained 10/15/2019 due to left hip pain  2. X-ray images were reviewed personally by me and then directly with patient.  3. FINDINGS: X-ray images obtained demonstrate no cortical irregularities, sclerosis, osteophyte formation, or subchonral cysts. There is no joint space narrowing.  4. IMPRESSION: No pathology or irregularities appreciated.       ASSESSMENT:      ICD-10-CM ICD-9-CM   1. Left hip pain M25.552 719.45   2. It band syndrome, left M76.32 728.89   3. Myalgia M79.10 729.1   4. Somatic dysfunction of lumbar region M99.03 739.3   5. Somatic dysfunction of pelvis region M99.05 739.5   6. Somatic dysfunction of lower extremity M99.06 739.6   7. Somatic dysfunction of sacral region M99.04 739.4   8. Somatic dysfunction of thoracic region M99.02 739.2       PLAN:  1-3.  Left hip pain/IT band syndrome/myalgia - improved    - Oneida presents for follow-up on her left hip pain that has been bothering her for the past 5 years.  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 OMT was performed at her last visit and she is still experiencing good pain relief from that.  She is doing her home exercises and feels as though these are helpful as well.    - Based on her description/body language of pain and somatic dysfunction identified on exam, I discussed osteopathic manipulation as a treatment option today.  She consents to evaluation and treatment.  See below.    - Continue with IT band and glute strengthening/retraining exercises daily.      4-8.  Somatic dysfunction of lumbar, pelvis, lower extremity, sacral, thoracic regions -      - OMT 3-4 regions. Oral consent obtained. Reviewed benefits and potential side effects. OMT indicated today due to signs and symptoms as well as local and remote somatic dysfunction findings and their related neurokinetic, lymphatic, fascial and/or arteriovenous body connections. OMT techniques used: Soft Tissue, Myofascial Release, Muscle Energy and Fascial Distortion Model. Treatment was tolerated well. Improvement noted in segmental mobility post-treatment in dysfunctional regions. There were no adverse events and no complications immediately following treatment. Advised plenty of water to help alleviate soreness.      Future planning includes - possibly more OMT if helpful and if indicated, possibly PT if symptoms start to worsen    All questions were answered to the best of my ability and all concerns were addressed at this time.    Follow up as needed for above..      This note is dictated using the M*Modal Fluency Direct word recognition program. There are word recognition mistakes that are occasionally missed on review.

## 2020-05-21 ENCOUNTER — PATIENT MESSAGE (OUTPATIENT)
Dept: DERMATOLOGY | Facility: CLINIC | Age: 40
End: 2020-05-21

## 2020-05-22 ENCOUNTER — OFFICE VISIT (OUTPATIENT)
Dept: DERMATOLOGY | Facility: CLINIC | Age: 40
End: 2020-05-22
Payer: COMMERCIAL

## 2020-05-22 VITALS — BODY MASS INDEX: 28.82 KG/M2 | WEIGHT: 184 LBS

## 2020-05-22 DIAGNOSIS — L30.9 DERMATITIS: Primary | ICD-10-CM

## 2020-05-22 PROCEDURE — 3008F BODY MASS INDEX DOCD: CPT | Mod: CPTII,,, | Performed by: DERMATOLOGY

## 2020-05-22 PROCEDURE — 99201 PR OFFICE/OUTPT VISIT,NEW,LEVL I: CPT | Mod: 95,,, | Performed by: DERMATOLOGY

## 2020-05-22 PROCEDURE — 3008F PR BODY MASS INDEX (BMI) DOCUMENTED: ICD-10-PCS | Mod: CPTII,,, | Performed by: DERMATOLOGY

## 2020-05-22 PROCEDURE — 99201 PR OFFICE/OUTPT VISIT,NEW,LEVL I: ICD-10-PCS | Mod: 95,,, | Performed by: DERMATOLOGY

## 2020-05-22 RX ORDER — BETAMETHASONE DIPROPIONATE 0.5 MG/G
CREAM TOPICAL 2 TIMES DAILY
Qty: 45 G | Refills: 3 | Status: SHIPPED | OUTPATIENT
Start: 2020-05-22 | End: 2021-11-02

## 2020-05-22 NOTE — PROGRESS NOTES
Subjective:       Patient ID:  Oneida Villalba is a 40 y.o. female who presents for   Chief Complaint   Patient presents with    Rash     bilateral arms     The patient location is: home  The chief complaint leading to consultation is: rash    Visit type: audiovisual    Face to Face time with patient: 10   minutes of total time spent on the encounter, which includes face to face time and non-face to face time preparing to see the patient (eg, review of tests), Obtaining and/or reviewing separately obtained history, Documenting clinical information in the electronic or other health record, Independently interpreting results (not separately reported) and communicating results to the patient/family/caregiver, or Care coordination (not separately reported).         Each patient to whom he or she provides medical services by telemedicine is:  (1) informed of the relationship between the physician and patient and the respective role of any other health care provider with respect to management of the patient; and (2) notified that he or she may decline to receive medical services by telemedicine and may withdraw from such care at any time.    Notes: she used a magic eraser on her arm 3 days ago and developed a dermatitis in the area, itches some used hc cream helped some.          Review of Systems   Constitutional: Negative for fever.   Skin: Positive for itching and rash.   Hematologic/Lymphatic: Does not bruise/bleed easily.        Objective:    Physical Exam   Skin:   Areas Examined (abnormalities noted in diagram):   LUE Inspection Performed                       Diagram Legend     Erythematous scaling macule/papule c/w actinic keratosis       Vascular papule c/w angioma      Pigmented verrucoid papule/plaque c/w seborrheic keratosis      Yellow umbilicated papule c/w sebaceous hyperplasia      Irregularly shaped tan macule c/w lentigo     1-2 mm smooth white papules consistent with Milia      Movable subcutaneous cyst  with punctum c/w epidermal inclusion cyst      Subcutaneous movable cyst c/w pilar cyst      Firm pink to brown papule c/w dermatofibroma      Pedunculated fleshy papule(s) c/w skin tag(s)      Evenly pigmented macule c/w junctional nevus     Mildly variegated pigmented, slightly irregular-bordered macule c/w mildly atypical nevus      Flesh colored to evenly pigmented papule c/w intradermal nevus       Pink pearly papule/plaque c/w basal cell carcinoma      Erythematous hyperkeratotic cursted plaque c/w SCC      Surgical scar with no sign of skin cancer recurrence      Open and closed comedones      Inflammatory papules and pustules      Verrucoid papule consistent consistent with wart     Erythematous eczematous patches and plaques     Dystrophic onycholytic nail with subungual debris c/w onychomycosis     Umbilicated papule    Erythematous-base heme-crusted tan verrucoid plaque consistent with inflamed seborrheic keratosis     Erythematous Silvery Scaling Plaque c/w Psoriasis     See annotation      Assessment / Plan:        Dermatitis  -     betamethasone dipropionate (DIPROLENE) 0.05 % cream; Apply topically 2 (two) times daily. for 10 days  Dispense: 45 g; Refill: 3  cerave itch relief  Call if not improved             Follow up if symptoms worsen or fail to improve.

## 2020-10-08 LAB
HUMAN PAPILLOMAVIRUS (HPV): NORMAL
HUMAN PAPILLOMAVIRUS (HPV): NORMAL
PAP SMEAR: NORMAL

## 2021-04-26 ENCOUNTER — PATIENT MESSAGE (OUTPATIENT)
Dept: RESEARCH | Facility: HOSPITAL | Age: 41
End: 2021-04-26

## 2021-05-26 ENCOUNTER — OFFICE VISIT (OUTPATIENT)
Dept: DERMATOLOGY | Facility: CLINIC | Age: 41
End: 2021-05-26
Payer: COMMERCIAL

## 2021-05-26 VITALS — BODY MASS INDEX: 28.82 KG/M2 | WEIGHT: 184 LBS

## 2021-05-26 DIAGNOSIS — I78.1 TELANGIECTASIAS: ICD-10-CM

## 2021-05-26 DIAGNOSIS — L81.4 LENTIGINES: ICD-10-CM

## 2021-05-26 DIAGNOSIS — D22.9 NEVUS OF MULTIPLE SITES: Primary | ICD-10-CM

## 2021-05-26 DIAGNOSIS — Z12.83 SKIN EXAM, SCREENING FOR CANCER: ICD-10-CM

## 2021-05-26 PROCEDURE — 1126F AMNT PAIN NOTED NONE PRSNT: CPT | Mod: S$GLB,,, | Performed by: DERMATOLOGY

## 2021-05-26 PROCEDURE — 1126F PR PAIN SEVERITY QUANTIFIED, NO PAIN PRESENT: ICD-10-PCS | Mod: S$GLB,,, | Performed by: DERMATOLOGY

## 2021-05-26 PROCEDURE — 3008F PR BODY MASS INDEX (BMI) DOCUMENTED: ICD-10-PCS | Mod: CPTII,S$GLB,, | Performed by: DERMATOLOGY

## 2021-05-26 PROCEDURE — 99999 PR PBB SHADOW E&M-EST. PATIENT-LVL II: ICD-10-PCS | Mod: PBBFAC,,, | Performed by: DERMATOLOGY

## 2021-05-26 PROCEDURE — 3008F BODY MASS INDEX DOCD: CPT | Mod: CPTII,S$GLB,, | Performed by: DERMATOLOGY

## 2021-05-26 PROCEDURE — 99213 OFFICE O/P EST LOW 20 MIN: CPT | Mod: S$GLB,,, | Performed by: DERMATOLOGY

## 2021-05-26 PROCEDURE — 99213 PR OFFICE/OUTPT VISIT, EST, LEVL III, 20-29 MIN: ICD-10-PCS | Mod: S$GLB,,, | Performed by: DERMATOLOGY

## 2021-05-26 PROCEDURE — 99999 PR PBB SHADOW E&M-EST. PATIENT-LVL II: CPT | Mod: PBBFAC,,, | Performed by: DERMATOLOGY

## 2021-06-14 ENCOUNTER — IMMUNIZATION (OUTPATIENT)
Dept: FAMILY MEDICINE | Facility: CLINIC | Age: 41
End: 2021-06-14
Payer: COMMERCIAL

## 2021-06-14 DIAGNOSIS — Z23 NEED FOR VACCINATION: Primary | ICD-10-CM

## 2021-06-14 PROCEDURE — 91300 COVID-19, MRNA, LNP-S, PF, 30 MCG/0.3 ML DOSE VACCINE: CPT | Mod: PBBFAC | Performed by: INTERNAL MEDICINE

## 2021-07-06 ENCOUNTER — IMMUNIZATION (OUTPATIENT)
Dept: FAMILY MEDICINE | Facility: CLINIC | Age: 41
End: 2021-07-06
Payer: COMMERCIAL

## 2021-07-06 DIAGNOSIS — Z23 NEED FOR VACCINATION: Primary | ICD-10-CM

## 2021-07-06 PROCEDURE — 91300 COVID-19, MRNA, LNP-S, PF, 30 MCG/0.3 ML DOSE VACCINE: CPT | Mod: PBBFAC | Performed by: FAMILY MEDICINE

## 2021-07-06 PROCEDURE — 0002A COVID-19, MRNA, LNP-S, PF, 30 MCG/0.3 ML DOSE VACCINE: CPT | Mod: PBBFAC | Performed by: FAMILY MEDICINE

## 2021-10-04 ENCOUNTER — PATIENT MESSAGE (OUTPATIENT)
Dept: FAMILY MEDICINE | Facility: CLINIC | Age: 41
End: 2021-10-04

## 2021-10-04 ENCOUNTER — OFFICE VISIT (OUTPATIENT)
Dept: FAMILY MEDICINE | Facility: CLINIC | Age: 41
End: 2021-10-04
Payer: COMMERCIAL

## 2021-10-04 ENCOUNTER — HOSPITAL ENCOUNTER (OUTPATIENT)
Dept: RADIOLOGY | Facility: HOSPITAL | Age: 41
Discharge: HOME | End: 2021-10-04
Attending: FAMILY MEDICINE
Payer: COMMERCIAL

## 2021-10-04 DIAGNOSIS — R50.9 FEVER OF UNKNOWN ORIGIN (FUO): ICD-10-CM

## 2021-10-04 DIAGNOSIS — N30.00 ACUTE CYSTITIS WITHOUT HEMATURIA: Primary | ICD-10-CM

## 2021-10-04 DIAGNOSIS — R50.9 FEVER OF UNKNOWN ORIGIN (FUO): Primary | ICD-10-CM

## 2021-10-04 PROCEDURE — 71046 X-RAY EXAM CHEST 2 VIEWS: CPT | Mod: 26,,, | Performed by: RADIOLOGY

## 2021-10-04 PROCEDURE — 1160F RVW MEDS BY RX/DR IN RCRD: CPT | Mod: CPTII,95,, | Performed by: FAMILY MEDICINE

## 2021-10-04 PROCEDURE — 99214 PR OFFICE/OUTPT VISIT, EST, LEVL IV, 30-39 MIN: ICD-10-PCS | Mod: 95,,, | Performed by: FAMILY MEDICINE

## 2021-10-04 PROCEDURE — 71046 X-RAY EXAM CHEST 2 VIEWS: CPT | Mod: TC,FY,PO

## 2021-10-04 PROCEDURE — 1159F MED LIST DOCD IN RCRD: CPT | Mod: CPTII,95,, | Performed by: FAMILY MEDICINE

## 2021-10-04 PROCEDURE — 1159F PR MEDICATION LIST DOCUMENTED IN MEDICAL RECORD: ICD-10-PCS | Mod: CPTII,95,, | Performed by: FAMILY MEDICINE

## 2021-10-04 PROCEDURE — 99214 OFFICE O/P EST MOD 30 MIN: CPT | Mod: 95,,, | Performed by: FAMILY MEDICINE

## 2021-10-04 PROCEDURE — 1160F PR REVIEW ALL MEDS BY PRESCRIBER/CLIN PHARMACIST DOCUMENTED: ICD-10-PCS | Mod: CPTII,95,, | Performed by: FAMILY MEDICINE

## 2021-10-04 PROCEDURE — 71046 XR CHEST PA AND LATERAL: ICD-10-PCS | Mod: 26,,, | Performed by: RADIOLOGY

## 2021-10-04 RX ORDER — NITROFURANTOIN 25; 75 MG/1; MG/1
100 CAPSULE ORAL 2 TIMES DAILY
Qty: 14 CAPSULE | Refills: 0 | Status: SHIPPED | OUTPATIENT
Start: 2021-10-04 | End: 2021-10-11

## 2021-10-06 ENCOUNTER — PATIENT MESSAGE (OUTPATIENT)
Dept: FAMILY MEDICINE | Facility: CLINIC | Age: 41
End: 2021-10-06

## 2021-10-11 ENCOUNTER — PATIENT MESSAGE (OUTPATIENT)
Dept: FAMILY MEDICINE | Facility: CLINIC | Age: 41
End: 2021-10-11

## 2021-11-02 ENCOUNTER — OFFICE VISIT (OUTPATIENT)
Dept: FAMILY MEDICINE | Facility: CLINIC | Age: 41
End: 2021-11-02
Payer: COMMERCIAL

## 2021-11-02 ENCOUNTER — PATIENT MESSAGE (OUTPATIENT)
Dept: FAMILY MEDICINE | Facility: CLINIC | Age: 41
End: 2021-11-02
Payer: COMMERCIAL

## 2021-11-02 ENCOUNTER — HOSPITAL ENCOUNTER (OUTPATIENT)
Dept: RADIOLOGY | Facility: HOSPITAL | Age: 41
Discharge: HOME | End: 2021-11-02
Attending: FAMILY MEDICINE
Payer: COMMERCIAL

## 2021-11-02 VITALS
OXYGEN SATURATION: 97 % | WEIGHT: 181.88 LBS | HEIGHT: 67 IN | SYSTOLIC BLOOD PRESSURE: 112 MMHG | DIASTOLIC BLOOD PRESSURE: 78 MMHG | HEART RATE: 91 BPM | BODY MASS INDEX: 28.55 KG/M2 | TEMPERATURE: 99 F

## 2021-11-02 DIAGNOSIS — Z11.59 NEED FOR HEPATITIS C SCREENING TEST: ICD-10-CM

## 2021-11-02 DIAGNOSIS — Z11.4 SCREENING FOR HIV (HUMAN IMMUNODEFICIENCY VIRUS): ICD-10-CM

## 2021-11-02 DIAGNOSIS — R50.9 FEVER OF UNKNOWN ORIGIN (FUO): Primary | ICD-10-CM

## 2021-11-02 DIAGNOSIS — R50.9 FEVER OF UNKNOWN ORIGIN (FUO): ICD-10-CM

## 2021-11-02 PROBLEM — M54.9 CHRONIC BACK PAIN: Status: ACTIVE | Noted: 2020-10-10

## 2021-11-02 PROBLEM — G89.29 CHRONIC BACK PAIN: Status: ACTIVE | Noted: 2020-10-10

## 2021-11-02 LAB
BILIRUB UR QL STRIP: NEGATIVE
CLARITY UR: CLEAR
COLOR UR: YELLOW
GLUCOSE UR QL STRIP: NEGATIVE
HGB UR QL STRIP: NEGATIVE
INFLUENZA A, MOLECULAR: NEGATIVE
INFLUENZA B, MOLECULAR: NEGATIVE
KETONES UR QL STRIP: NEGATIVE
LEUKOCYTE ESTERASE UR QL STRIP: NEGATIVE
NITRITE UR QL STRIP: NEGATIVE
PH UR STRIP: 6 [PH] (ref 5–8)
PROT UR QL STRIP: NEGATIVE
SP GR UR STRIP: <=1.005 (ref 1–1.03)
SPECIMEN SOURCE: NORMAL
URN SPEC COLLECT METH UR: ABNORMAL

## 2021-11-02 PROCEDURE — 71046 X-RAY EXAM CHEST 2 VIEWS: CPT | Mod: TC,FY,PO

## 2021-11-02 PROCEDURE — 3078F PR MOST RECENT DIASTOLIC BLOOD PRESSURE < 80 MM HG: ICD-10-PCS | Mod: CPTII,S$GLB,, | Performed by: FAMILY MEDICINE

## 2021-11-02 PROCEDURE — 87502 INFLUENZA DNA AMP PROBE: CPT | Mod: PO | Performed by: FAMILY MEDICINE

## 2021-11-02 PROCEDURE — 71046 X-RAY EXAM CHEST 2 VIEWS: CPT | Mod: 26,,, | Performed by: RADIOLOGY

## 2021-11-02 PROCEDURE — 1160F PR REVIEW ALL MEDS BY PRESCRIBER/CLIN PHARMACIST DOCUMENTED: ICD-10-PCS | Mod: CPTII,S$GLB,, | Performed by: FAMILY MEDICINE

## 2021-11-02 PROCEDURE — 99214 PR OFFICE/OUTPT VISIT, EST, LEVL IV, 30-39 MIN: ICD-10-PCS | Mod: S$GLB,,, | Performed by: FAMILY MEDICINE

## 2021-11-02 PROCEDURE — 71046 XR CHEST PA AND LATERAL: ICD-10-PCS | Mod: 26,,, | Performed by: RADIOLOGY

## 2021-11-02 PROCEDURE — 99999 PR PBB SHADOW E&M-EST. PATIENT-LVL III: ICD-10-PCS | Mod: PBBFAC,,, | Performed by: FAMILY MEDICINE

## 2021-11-02 PROCEDURE — 1159F PR MEDICATION LIST DOCUMENTED IN MEDICAL RECORD: ICD-10-PCS | Mod: CPTII,S$GLB,, | Performed by: FAMILY MEDICINE

## 2021-11-02 PROCEDURE — 3074F SYST BP LT 130 MM HG: CPT | Mod: CPTII,S$GLB,, | Performed by: FAMILY MEDICINE

## 2021-11-02 PROCEDURE — U0005 INFEC AGEN DETEC AMPLI PROBE: HCPCS | Performed by: FAMILY MEDICINE

## 2021-11-02 PROCEDURE — 1159F MED LIST DOCD IN RCRD: CPT | Mod: CPTII,S$GLB,, | Performed by: FAMILY MEDICINE

## 2021-11-02 PROCEDURE — 3008F BODY MASS INDEX DOCD: CPT | Mod: CPTII,S$GLB,, | Performed by: FAMILY MEDICINE

## 2021-11-02 PROCEDURE — 1160F RVW MEDS BY RX/DR IN RCRD: CPT | Mod: CPTII,S$GLB,, | Performed by: FAMILY MEDICINE

## 2021-11-02 PROCEDURE — 99999 PR PBB SHADOW E&M-EST. PATIENT-LVL III: CPT | Mod: PBBFAC,,, | Performed by: FAMILY MEDICINE

## 2021-11-02 PROCEDURE — 3074F PR MOST RECENT SYSTOLIC BLOOD PRESSURE < 130 MM HG: ICD-10-PCS | Mod: CPTII,S$GLB,, | Performed by: FAMILY MEDICINE

## 2021-11-02 PROCEDURE — 3008F PR BODY MASS INDEX (BMI) DOCUMENTED: ICD-10-PCS | Mod: CPTII,S$GLB,, | Performed by: FAMILY MEDICINE

## 2021-11-02 PROCEDURE — 3078F DIAST BP <80 MM HG: CPT | Mod: CPTII,S$GLB,, | Performed by: FAMILY MEDICINE

## 2021-11-02 PROCEDURE — 81003 URINALYSIS AUTO W/O SCOPE: CPT | Mod: PO | Performed by: FAMILY MEDICINE

## 2021-11-02 PROCEDURE — 99214 OFFICE O/P EST MOD 30 MIN: CPT | Mod: S$GLB,,, | Performed by: FAMILY MEDICINE

## 2021-11-02 PROCEDURE — U0003 INFECTIOUS AGENT DETECTION BY NUCLEIC ACID (DNA OR RNA); SEVERE ACUTE RESPIRATORY SYNDROME CORONAVIRUS 2 (SARS-COV-2) (CORONAVIRUS DISEASE [COVID-19]), AMPLIFIED PROBE TECHNIQUE, MAKING USE OF HIGH THROUGHPUT TECHNOLOGIES AS DESCRIBED BY CMS-2020-01-R: HCPCS | Performed by: FAMILY MEDICINE

## 2021-11-02 RX ORDER — ESCITALOPRAM OXALATE 10 MG/1
10 TABLET ORAL DAILY
COMMUNITY
End: 2023-11-06

## 2021-11-03 ENCOUNTER — PATIENT MESSAGE (OUTPATIENT)
Dept: FAMILY MEDICINE | Facility: CLINIC | Age: 41
End: 2021-11-03
Payer: COMMERCIAL

## 2021-11-03 DIAGNOSIS — R50.9 FEVER OF UNKNOWN ORIGIN (FUO): Primary | ICD-10-CM

## 2021-11-03 LAB
SARS-COV-2 RNA RESP QL NAA+PROBE: NOT DETECTED
SARS-COV-2- CYCLE NUMBER: NORMAL

## 2021-11-05 ENCOUNTER — PATIENT OUTREACH (OUTPATIENT)
Dept: ADMINISTRATIVE | Facility: HOSPITAL | Age: 41
End: 2021-11-05
Payer: COMMERCIAL

## 2021-11-11 DIAGNOSIS — Z12.31 OTHER SCREENING MAMMOGRAM: ICD-10-CM

## 2022-03-04 ENCOUNTER — PATIENT OUTREACH (OUTPATIENT)
Dept: ADMINISTRATIVE | Facility: OTHER | Age: 42
End: 2022-03-04
Payer: COMMERCIAL

## 2022-03-04 NOTE — PROGRESS NOTES
Care Everywhere: updated  Immunization: updated  Health Maintenance: updated  Media Review:   Legacy Review:   DIS:  Order placed:   Upcoming appts:mammogram 3.21  EFAX:  Task Tickets:  Referrals:

## 2022-03-07 ENCOUNTER — OFFICE VISIT (OUTPATIENT)
Dept: DERMATOLOGY | Facility: CLINIC | Age: 42
End: 2022-03-07
Payer: COMMERCIAL

## 2022-03-07 DIAGNOSIS — L24.9 IRRITANT CONTACT DERMATITIS, UNSPECIFIED TRIGGER: Primary | ICD-10-CM

## 2022-03-07 DIAGNOSIS — Z91.018 ALLERGY TO MANGO FRUIT: ICD-10-CM

## 2022-03-07 PROCEDURE — 1159F MED LIST DOCD IN RCRD: CPT | Mod: CPTII,95,, | Performed by: DERMATOLOGY

## 2022-03-07 PROCEDURE — 99213 PR OFFICE/OUTPT VISIT, EST, LEVL III, 20-29 MIN: ICD-10-PCS | Mod: 95,,, | Performed by: DERMATOLOGY

## 2022-03-07 PROCEDURE — 1160F RVW MEDS BY RX/DR IN RCRD: CPT | Mod: CPTII,95,, | Performed by: DERMATOLOGY

## 2022-03-07 PROCEDURE — 1159F PR MEDICATION LIST DOCUMENTED IN MEDICAL RECORD: ICD-10-PCS | Mod: CPTII,95,, | Performed by: DERMATOLOGY

## 2022-03-07 PROCEDURE — 1160F PR REVIEW ALL MEDS BY PRESCRIBER/CLIN PHARMACIST DOCUMENTED: ICD-10-PCS | Mod: CPTII,95,, | Performed by: DERMATOLOGY

## 2022-03-07 PROCEDURE — 99213 OFFICE O/P EST LOW 20 MIN: CPT | Mod: 95,,, | Performed by: DERMATOLOGY

## 2022-03-07 RX ORDER — TRIAMCINOLONE ACETONIDE 0.25 MG/G
OINTMENT TOPICAL
Qty: 15 G | Refills: 1 | Status: SHIPPED | OUTPATIENT
Start: 2022-03-07 | End: 2023-11-06

## 2022-03-07 NOTE — PROGRESS NOTES
The patient location is: home  The chief complaint leading to consultation is: rash  Visit type: virtual visit with synchronous audio and video  Total time spent with patient: 10 minutes  Each patient to whom I provide medical services by telemedicine is:  (1) informed of the relationship between the physician and patient and the respective role of any other health care provider with respect to management of the patient; and (2) notified that he or she may decline to receive medical services by telemedicine and may withdraw from such care at any time.    Patient Information  Name: Oneida Villabla  : 1980  MRN: [id number]     Referring Physician:  No ref. provider found   Primary Care Physician:  Darvin Mcghee MD   Date of Visit: 2022      Subjective:     History of Present lllness:    Oneida Villalba is a 41 y.o. female who presents with a chief complaint of rash.    Location: lips  Duration: 3 months  Signs/Symptoms: bleeding, blistering, burning, cracking, crusting, dryness, inflamed, irritated, non-healing, pain, peeling, redness, rough, scabs, scaling, spreading, tender  Relieving factors/Prior treatments: aquaphor, vicente's chapstick, OTC moisturizers    Clinical documentation obtained by nursing staff reviewed.    Review of Systems   Skin: Positive for rash. Negative for itching.       Objective:   Physical Exam   Constitutional: She appears well-developed and well-nourished. No distress.   Neurological: She is alert and oriented to person, place, and time. She is not disoriented.   Psychiatric: She has a normal mood and affect.   Skin:   Areas Examined (abnormalities noted in diagram):   Head / Face Inspection Performed                   Assessment / Plan:        Irritant contact dermatitis, unspecified trigger (vs Allergic contact dermatitis to food/dick cross-reactor)  - acute, uncomplicated problem  -     triamcinolone acetonide 0.025% (KENALOG) 0.025 % Oint; Apply to affected areas of face BID prn  irritation. Do not use for longer than 2 weeks in a row.  Dispense: 15 g; Refill: 1  Side effects from the overuse of topical steroids include thinning of skin, easy tearing/bruising of skin, stretch marks, spider veins, etc. Use the topical steroid no more than 2 days per week if used long-term and/or take breaks from the topical steroid, especially if any of the above side effects are noticed.  Will add in a topical calcineurin inhibitor if needed for long-term use.    Recommended plain Vaseline at least twice daily and before bed.  Keep food journal to monitor for flares.    Follow up in about 2 months (around 5/7/2022) for follow up, or sooner if symptoms worsening or not improving.      Hetal Lopez MD, JONATHAND  [facility]

## 2022-03-30 DIAGNOSIS — Z12.31 OTHER SCREENING MAMMOGRAM: ICD-10-CM

## 2022-05-31 ENCOUNTER — PATIENT MESSAGE (OUTPATIENT)
Dept: ADMINISTRATIVE | Facility: HOSPITAL | Age: 42
End: 2022-05-31
Payer: COMMERCIAL

## 2022-08-03 DIAGNOSIS — M25.539 PAIN IN WRIST, UNSPECIFIED LATERALITY: Primary | ICD-10-CM

## 2022-08-10 ENCOUNTER — OFFICE VISIT (OUTPATIENT)
Dept: ORTHOPEDICS | Facility: CLINIC | Age: 42
End: 2022-08-10
Payer: COMMERCIAL

## 2022-08-10 ENCOUNTER — HOSPITAL ENCOUNTER (OUTPATIENT)
Dept: RADIOLOGY | Facility: HOSPITAL | Age: 42
Discharge: HOME | End: 2022-08-10
Attending: ORTHOPAEDIC SURGERY
Payer: COMMERCIAL

## 2022-08-10 VITALS — BODY MASS INDEX: 28.41 KG/M2 | RESPIRATION RATE: 18 BRPM | WEIGHT: 181 LBS | HEIGHT: 67 IN

## 2022-08-10 DIAGNOSIS — M25.539 PAIN IN WRIST, UNSPECIFIED LATERALITY: ICD-10-CM

## 2022-08-10 DIAGNOSIS — M25.539 PAIN IN WRIST, UNSPECIFIED LATERALITY: Primary | ICD-10-CM

## 2022-08-10 DIAGNOSIS — M77.8 LEFT WRIST TENDINITIS: Primary | ICD-10-CM

## 2022-08-10 PROCEDURE — 73110 X-RAY EXAM OF WRIST: CPT | Mod: TC,PO,LT

## 2022-08-10 PROCEDURE — 1159F PR MEDICATION LIST DOCUMENTED IN MEDICAL RECORD: ICD-10-PCS | Mod: CPTII,S$GLB,, | Performed by: ORTHOPAEDIC SURGERY

## 2022-08-10 PROCEDURE — 73110 XR WRIST COMPLETE 3 VIEWS LEFT: ICD-10-PCS | Mod: 26,LT,, | Performed by: RADIOLOGY

## 2022-08-10 PROCEDURE — 99213 PR OFFICE/OUTPT VISIT, EST, LEVL III, 20-29 MIN: ICD-10-PCS | Mod: S$GLB,,, | Performed by: ORTHOPAEDIC SURGERY

## 2022-08-10 PROCEDURE — 99213 OFFICE O/P EST LOW 20 MIN: CPT | Mod: S$GLB,,, | Performed by: ORTHOPAEDIC SURGERY

## 2022-08-10 PROCEDURE — 1159F MED LIST DOCD IN RCRD: CPT | Mod: CPTII,S$GLB,, | Performed by: ORTHOPAEDIC SURGERY

## 2022-08-10 PROCEDURE — 1160F PR REVIEW ALL MEDS BY PRESCRIBER/CLIN PHARMACIST DOCUMENTED: ICD-10-PCS | Mod: CPTII,S$GLB,, | Performed by: ORTHOPAEDIC SURGERY

## 2022-08-10 PROCEDURE — 99999 PR PBB SHADOW E&M-EST. PATIENT-LVL III: CPT | Mod: PBBFAC,,, | Performed by: ORTHOPAEDIC SURGERY

## 2022-08-10 PROCEDURE — 99999 PR PBB SHADOW E&M-EST. PATIENT-LVL III: ICD-10-PCS | Mod: PBBFAC,,, | Performed by: ORTHOPAEDIC SURGERY

## 2022-08-10 PROCEDURE — 3008F PR BODY MASS INDEX (BMI) DOCUMENTED: ICD-10-PCS | Mod: CPTII,S$GLB,, | Performed by: ORTHOPAEDIC SURGERY

## 2022-08-10 PROCEDURE — 73110 X-RAY EXAM OF WRIST: CPT | Mod: 26,LT,, | Performed by: RADIOLOGY

## 2022-08-10 PROCEDURE — 3008F BODY MASS INDEX DOCD: CPT | Mod: CPTII,S$GLB,, | Performed by: ORTHOPAEDIC SURGERY

## 2022-08-10 PROCEDURE — 1160F RVW MEDS BY RX/DR IN RCRD: CPT | Mod: CPTII,S$GLB,, | Performed by: ORTHOPAEDIC SURGERY

## 2022-08-10 NOTE — PROGRESS NOTES
Past Medical History:   Diagnosis Date    Heart murmur        History reviewed. No pertinent surgical history.    Current Outpatient Medications   Medication Sig    EScitalopram oxalate (LEXAPRO) 10 MG tablet Take 10 mg by mouth once daily.    prenatal 21/iron fu/folic acid (PRENATAL COMPLETE ORAL) Take by mouth.    triamcinolone acetonide 0.025% (KENALOG) 0.025 % Oint Apply to affected areas of face twice daily as needed for irritation. Do not use for longer than 2 weeks in a row.     No current facility-administered medications for this visit.       Review of patient's allergies indicates:   Allergen Reactions    Nick Anaphylaxis, Blisters, Dermatitis, Hives, Itching, Rash and Swelling       Family History   Problem Relation Age of Onset    No Known Problems Mother     No Known Problems Father     No Known Problems Sister     No Known Problems Brother     No Known Problems Maternal Aunt     No Known Problems Maternal Uncle     No Known Problems Paternal Aunt     No Known Problems Paternal Uncle     No Known Problems Maternal Grandmother     No Known Problems Maternal Grandfather     Cancer Paternal Grandmother     No Known Problems Paternal Grandfather     Amblyopia Neg Hx     Blindness Neg Hx     Cataracts Neg Hx     Glaucoma Neg Hx     Macular degeneration Neg Hx     Retinal detachment Neg Hx     Strabismus Neg Hx     Diabetes Neg Hx     Hypertension Neg Hx     Stroke Neg Hx     Thyroid disease Neg Hx        Social History     Socioeconomic History    Marital status:    Tobacco Use    Smoking status: Former Smoker     Quit date: 12/10/2003     Years since quittin.6    Smokeless tobacco: Never Used   Substance and Sexual Activity    Alcohol use: Yes    Drug use: No       Chief Complaint:   Chief Complaint   Patient presents with    Left Wrist - Pain       History of present illness:  42-year-old right-hand-dominant female seen for left wrist pain.  It has been  ongoing for about 6 months.  It is on and off.  Does not hurt every day.  Recently worse after traveling with her toddler.  Pain mainly with hyper extension.  Pain is a 4/10.  Does have a brace that she wears periodically.    Answers for HPI/ROS submitted by the patient on 8/10/2022  unexpected weight change: No  appetite change : No  sleep disturbance: No  IMMUNOCOMPROMISED: No  nervous/ anxious: No  dysphoric mood: No  rash: No  visual disturbance: No  eye redness: No  eye pain: No  ear pain: No  tinnitus: No  hearing loss: No  sinus pressure : No  nosebleeds: No  enviro allergies: No  food allergies: No  cough: No  shortness of breath: No  sweating: No  dysuria: No  frequency: No  difficulty urinating: No  hematuria: No  painful intercourse: No  chest pain: No  palpitations: No  nausea: No  vomiting: No  diarrhea: No  blood in stool: No  constipation: No  headaches: No  dizziness: No  numbness: No  seizures: No  joint swelling: No  myalgia: No  weakness: No  back pain: No  Pain Chronicity: chronic  History of trauma: No  Onset: more than 1 month ago  Frequency: daily  Progression since onset: unchanged  Injury mechanism: lifting  injury location: at home  pain- numeric: 4/10  pain location: left wrist  pain quality: dull  Radiating Pain: Yes  If your pain is radiating, to what part of the body?: left arm  Aggravating factors: lifting  fever: No  inability to bear weight: No  itching: No  joint locking: No  limited range of motion: No  stiffness: No  tingling: No  Treatments tried: NSAIDs  physical therapy: not tried  Improvement on treatment: mild        Physical Examination:    Vital Signs:    Vitals:    08/10/22 1310   Resp: 18       Body mass index is 28.35 kg/m².    This a well-developed, well nourished patient in no acute distress.  They are alert and oriented and cooperative to examination.  Pt. walks without an antalgic gait.      Examination of the left hand and wrist shows no signs of rashes or erythema.  Patient has no masses ecchymosis or effusions. Patient has full range of motion of the wrist in flexion and extension as well as ulnar and radial deviation. The patient also has full range of motion of all joints in the hand. There are 2+ radial pulse and intact light touch sensation in all 5 digits. Nontender over the anatomic snuffbox. Negative Tinel's. Negative Finkelstein's test.       X-rays:  X-rays of the left wrist are ordered and reviewed which show no atypical findings.     Assessment::  Left wrist tendinitis    Plan:  Reviewed the findings with her today.  I think she can continue to use the brace as needed.  Recommended couple sessions with the hand therapist to teach her some exercises.  Follow-up if needed.    This note was created using Unbabel voice recognition software that occasionally misinterpreted phrases or words.    Consult note is delivered via Epic messaging service.

## 2022-08-24 ENCOUNTER — PATIENT MESSAGE (OUTPATIENT)
Dept: ADMINISTRATIVE | Facility: HOSPITAL | Age: 42
End: 2022-08-24
Payer: COMMERCIAL

## 2022-09-16 ENCOUNTER — OFFICE VISIT (OUTPATIENT)
Dept: RHEUMATOLOGY | Facility: CLINIC | Age: 42
End: 2022-09-16
Payer: COMMERCIAL

## 2022-09-16 ENCOUNTER — LAB VISIT (OUTPATIENT)
Dept: LAB | Facility: HOSPITAL | Age: 42
End: 2022-09-16
Attending: FAMILY MEDICINE
Payer: COMMERCIAL

## 2022-09-16 VITALS — WEIGHT: 184.31 LBS | BODY MASS INDEX: 28.93 KG/M2 | HEIGHT: 67 IN

## 2022-09-16 DIAGNOSIS — R79.82 CRP ELEVATED: ICD-10-CM

## 2022-09-16 DIAGNOSIS — R50.9 FEVER OF UNKNOWN ORIGIN (FUO): Primary | ICD-10-CM

## 2022-09-16 DIAGNOSIS — R70.0 ESR RAISED: ICD-10-CM

## 2022-09-16 DIAGNOSIS — R50.9 FEVER OF UNKNOWN ORIGIN (FUO): ICD-10-CM

## 2022-09-16 LAB
BASOPHILS # BLD AUTO: 0.1 K/UL (ref 0–0.2)
BASOPHILS NFR BLD: 1 % (ref 0–1.9)
DIFFERENTIAL METHOD: ABNORMAL
EOSINOPHIL # BLD AUTO: 0.2 K/UL (ref 0–0.5)
EOSINOPHIL NFR BLD: 1.5 % (ref 0–8)
ERYTHROCYTE [DISTWIDTH] IN BLOOD BY AUTOMATED COUNT: 11.9 % (ref 11.5–14.5)
ERYTHROCYTE [SEDIMENTATION RATE] IN BLOOD BY PHOTOMETRIC METHOD: <2 MM/HR (ref 0–36)
FERRITIN SERPL-MCNC: 77 NG/ML (ref 20–300)
HCT VFR BLD AUTO: 43.8 % (ref 37–48.5)
HGB BLD-MCNC: 15.4 G/DL (ref 12–16)
IMM GRANULOCYTES # BLD AUTO: 0.04 K/UL (ref 0–0.04)
IMM GRANULOCYTES NFR BLD AUTO: 0.4 % (ref 0–0.5)
LYMPHOCYTES # BLD AUTO: 3.2 K/UL (ref 1–4.8)
LYMPHOCYTES NFR BLD: 32.4 % (ref 18–48)
MCH RBC QN AUTO: 32.2 PG (ref 27–31)
MCHC RBC AUTO-ENTMCNC: 35.2 G/DL (ref 32–36)
MCV RBC AUTO: 91 FL (ref 82–98)
MONOCYTES # BLD AUTO: 0.6 K/UL (ref 0.3–1)
MONOCYTES NFR BLD: 6 % (ref 4–15)
NEUTROPHILS # BLD AUTO: 5.9 K/UL (ref 1.8–7.7)
NEUTROPHILS NFR BLD: 58.7 % (ref 38–73)
NRBC BLD-RTO: 0 /100 WBC
PLATELET # BLD AUTO: 196 K/UL (ref 150–450)
PLATELET BLD QL SMEAR: ABNORMAL
PMV BLD AUTO: 9.9 FL (ref 9.2–12.9)
RBC # BLD AUTO: 4.79 M/UL (ref 4–5.4)
RHEUMATOID FACT SERPL-ACNC: <13 IU/ML (ref 0–15)
WBC # BLD AUTO: 9.98 K/UL (ref 3.9–12.7)

## 2022-09-16 PROCEDURE — 82728 ASSAY OF FERRITIN: CPT | Performed by: INTERNAL MEDICINE

## 2022-09-16 PROCEDURE — 86480 TB TEST CELL IMMUN MEASURE: CPT | Performed by: INTERNAL MEDICINE

## 2022-09-16 PROCEDURE — 85652 RBC SED RATE AUTOMATED: CPT | Performed by: INTERNAL MEDICINE

## 2022-09-16 PROCEDURE — 36415 COLL VENOUS BLD VENIPUNCTURE: CPT | Performed by: INTERNAL MEDICINE

## 2022-09-16 PROCEDURE — 99204 PR OFFICE/OUTPT VISIT, NEW, LEVL IV, 45-59 MIN: ICD-10-PCS | Mod: S$GLB,,, | Performed by: INTERNAL MEDICINE

## 2022-09-16 PROCEDURE — 82164 ANGIOTENSIN I ENZYME TEST: CPT | Performed by: INTERNAL MEDICINE

## 2022-09-16 PROCEDURE — 99204 OFFICE O/P NEW MOD 45 MIN: CPT | Mod: S$GLB,,, | Performed by: INTERNAL MEDICINE

## 2022-09-16 PROCEDURE — 1159F PR MEDICATION LIST DOCUMENTED IN MEDICAL RECORD: ICD-10-PCS | Mod: CPTII,S$GLB,, | Performed by: INTERNAL MEDICINE

## 2022-09-16 PROCEDURE — 1159F MED LIST DOCD IN RCRD: CPT | Mod: CPTII,S$GLB,, | Performed by: INTERNAL MEDICINE

## 2022-09-16 PROCEDURE — 87340 HEPATITIS B SURFACE AG IA: CPT | Performed by: INTERNAL MEDICINE

## 2022-09-16 PROCEDURE — 99999 PR PBB SHADOW E&M-EST. PATIENT-LVL III: ICD-10-PCS | Mod: PBBFAC,,, | Performed by: INTERNAL MEDICINE

## 2022-09-16 PROCEDURE — 3008F PR BODY MASS INDEX (BMI) DOCUMENTED: ICD-10-PCS | Mod: CPTII,S$GLB,, | Performed by: INTERNAL MEDICINE

## 2022-09-16 PROCEDURE — 3008F BODY MASS INDEX DOCD: CPT | Mod: CPTII,S$GLB,, | Performed by: INTERNAL MEDICINE

## 2022-09-16 PROCEDURE — 86147 CARDIOLIPIN ANTIBODY EA IG: CPT | Mod: 59 | Performed by: INTERNAL MEDICINE

## 2022-09-16 PROCEDURE — 86146 BETA-2 GLYCOPROTEIN ANTIBODY: CPT | Performed by: INTERNAL MEDICINE

## 2022-09-16 PROCEDURE — 99999 PR PBB SHADOW E&M-EST. PATIENT-LVL III: CPT | Mod: PBBFAC,,, | Performed by: INTERNAL MEDICINE

## 2022-09-16 PROCEDURE — 85025 COMPLETE CBC W/AUTO DIFF WBC: CPT | Performed by: INTERNAL MEDICINE

## 2022-09-16 PROCEDURE — 86704 HEP B CORE ANTIBODY TOTAL: CPT | Performed by: INTERNAL MEDICINE

## 2022-09-16 PROCEDURE — 86431 RHEUMATOID FACTOR QUANT: CPT | Performed by: INTERNAL MEDICINE

## 2022-09-16 PROCEDURE — 86235 NUCLEAR ANTIGEN ANTIBODY: CPT | Mod: 59 | Performed by: INTERNAL MEDICINE

## 2022-09-16 PROCEDURE — 85730 THROMBOPLASTIN TIME PARTIAL: CPT | Performed by: INTERNAL MEDICINE

## 2022-09-16 NOTE — PROGRESS NOTES
RHEUMATOLOGY OUTPATIENT CLINIC NOTE    9/16/2022    Attending Rheumatologist: Zachary De Santiago  Primary Care Provider/Physician Requesting Consultation: Darvin Mcghee MD   Chief Complaint/Reason For Consultation:  Fever of unknown origin       Subjective:     Oneida Villalba is a 42 y.o. White female with history of fever of unknown origin for rheumatic evaluation.    No acute complaints at present. Main concern of chronic fatigue.  Denies fever (measured temperature >100.4F) since last year.      Review of Systems   Constitutional:  Positive for chills (Associated with night sweats) and malaise/fatigue. Negative for fever.   HENT:          Denies significant xerostomia or xerophthalmia.  Denies jaw claudication.   Eyes:  Negative for photophobia and pain.        No history uveitis   Respiratory:  Negative for cough and shortness of breath.    Cardiovascular:  Negative for chest pain, palpitations and leg swelling.   Gastrointestinal:  Negative for blood in stool and melena.        Denies dysphagia.  No history of IBD.  Denies history of diverticulosis/diverticulitis.   Genitourinary:  Negative for dysuria, hematuria and urgency.        Denies history of nephrolithiasis.   Musculoskeletal:  Positive for joint pain (Intermittent, most prominent on left elbow status post injury early 20s.  Denies association with joint swelling.). Negative for back pain.   Skin:  Positive for rash (History of chronic contact irritant dermatitis).        Denies photosensitive rashes or Raynaud's phenomena.  No personal or family history of psoriasis.  Denies ulcerative skin or nasal/oral mucosa ulcerations.   Neurological:  Negative for focal weakness, seizures and weakness.   Endo/Heme/Allergies:         No history of vascular thrombosis.  Denies pregnancy loss after 10 weeks gestation.  Episode of preeclampsia.   Psychiatric/Behavioral:  Negative for depression.      Chronic comorbid conditions affecting medical decision making  today:  Past Medical History:   Diagnosis Date    Heart murmur      History reviewed. No pertinent surgical history.  Family History   Problem Relation Age of Onset    No Known Problems Mother     No Known Problems Father     No Known Problems Sister     No Known Problems Brother     No Known Problems Maternal Aunt     No Known Problems Maternal Uncle     No Known Problems Paternal Aunt     No Known Problems Paternal Uncle     No Known Problems Maternal Grandmother     No Known Problems Maternal Grandfather     Cancer Paternal Grandmother     No Known Problems Paternal Grandfather     Amblyopia Neg Hx     Blindness Neg Hx     Cataracts Neg Hx     Glaucoma Neg Hx     Macular degeneration Neg Hx     Retinal detachment Neg Hx     Strabismus Neg Hx     Diabetes Neg Hx     Hypertension Neg Hx     Stroke Neg Hx     Thyroid disease Neg Hx      Social History     Tobacco Use   Smoking Status Former    Types: Cigarettes    Quit date: 12/10/2003    Years since quittin.7   Smokeless Tobacco Never       Current Outpatient Medications:     EScitalopram oxalate (LEXAPRO) 10 MG tablet, Take 10 mg by mouth once daily., Disp: , Rfl:     prenatal 21/iron fu/folic acid (PRENATAL COMPLETE ORAL), Take by mouth., Disp: , Rfl:     triamcinolone acetonide 0.025% (KENALOG) 0.025 % Oint, Apply to affected areas of face twice daily as needed for irritation. Do not use for longer than 2 weeks in a row., Disp: 15 g, Rfl: 1     Objective:     There were no vitals filed for this visit.  Physical Exam   Eyes: Conjunctivae are normal.   Pulmonary/Chest: Effort normal. No respiratory distress.   Musculoskeletal:         General: No swelling or tenderness. Normal range of motion.   Neurological: She displays no weakness.   Skin: No rash noted.     Reviewed available old and all outside pertinent medical records available.    All lab results personally reviewed and interpreted by me.       ASSESSMENT:     Fever of unknown origin  (FUO)    Elevation of ESR    Chronic arthralgias    Chronic fatigue    PLAN:     Evaluation for history of elevation of acute phase reactants.  Detected during evaluation for history of fever of unknown origin.  Urinalysis demonstrating presence of leukocytes at the time acute, treated with oral antibiotic therapy.  Acute phase reactants measured a month after abnormal UA.  Urine culture came back negative.  Patient voices no acute complaints at present.  Refers chronic intermittent arthralgias more notable on wrists associated with chronic fatigue.  Denies measuring temperature equal of over 100.4 since last year.  Refers intermittent night sweats and chills.  Review of system negative for significant sicca symptoms, photosensitive rashes, or concerning respiratory symptoms.  Denies Raynaud phenomena, skin thickening, or ulcerative skin lesions.  No family history of rheumatic disease the patient is aware.  Exam nonfocal without suspicious rashes or significant squeeze tenderness suggestive of subclinical synovitis.  Koebner reaction absent.  Labs from 6 months ago without evidence of kidney damage or liver disease.  No significant cytopenias either.  Proteinuria not detected on labs.  Chest x-ray reported without significant lymphadenopathy or chest infiltrates.  No marginal erosive changes or ankylosis on wrist x-rays.  Recommend repeating acute phase reactants and will add rheumatic workup pertinent to history of fever of unknown origin to determine presence of persistent serologic abnormality.  Broad differential for chronic fatigue and arthralgias, will await workup results for therapeutic recommendations.  If persistent elevation of acute phase reactants or unrevealing rest of rheumatic workup, will perform advanced imaging CT abdomen and pelvis and add ANCA serologies.    Disclaimer: This note is prepared using voice recognition software and as such is likely to have errors and has not been proof read.  Please contact me for questions.       Zachary De Santiago M.D.

## 2022-09-17 LAB
HBV CORE AB SERPL QL IA: NORMAL
HBV SURFACE AG SERPL QL IA: NORMAL

## 2022-09-19 ENCOUNTER — PATIENT MESSAGE (OUTPATIENT)
Dept: RHEUMATOLOGY | Facility: CLINIC | Age: 42
End: 2022-09-19
Payer: COMMERCIAL

## 2022-09-19 LAB
ACE SERPL-CCNC: 22 U/L (ref 16–85)
CARDIOLIPIN IGG SER IA-ACNC: 10 GPL (ref 0–14.99)
CARDIOLIPIN IGM SER IA-ACNC: 19.6 MPL (ref 0–12.49)
GAMMA INTERFERON BACKGROUND BLD IA-ACNC: 0.03 IU/ML
M TB IFN-G CD4+ BCKGRND COR BLD-ACNC: 0.01 IU/ML
MITOGEN IGNF BCKGRD COR BLD-ACNC: 9.97 IU/ML
TB GOLD PLUS: NEGATIVE
TB2 - NIL: -0.01 IU/ML

## 2022-09-20 ENCOUNTER — HOSPITAL ENCOUNTER (OUTPATIENT)
Dept: RADIOLOGY | Facility: HOSPITAL | Age: 42
Discharge: HOME | End: 2022-09-20
Attending: FAMILY MEDICINE
Payer: COMMERCIAL

## 2022-09-20 DIAGNOSIS — Z12.31 OTHER SCREENING MAMMOGRAM: ICD-10-CM

## 2022-09-20 LAB
B2 GLYCOPROT1 IGA SER QL: <9 SAU
B2 GLYCOPROT1 IGG SER QL: <9 SGU
B2 GLYCOPROT1 IGM SER QL: <9 SMU

## 2022-09-20 PROCEDURE — 77063 MAMMO DIGITAL SCREENING BILAT WITH TOMO: ICD-10-PCS | Mod: 26,,, | Performed by: RADIOLOGY

## 2022-09-20 PROCEDURE — 77063 BREAST TOMOSYNTHESIS BI: CPT | Mod: TC,PO

## 2022-09-20 PROCEDURE — 77063 BREAST TOMOSYNTHESIS BI: CPT | Mod: 26,,, | Performed by: RADIOLOGY

## 2022-09-20 PROCEDURE — 77067 SCR MAMMO BI INCL CAD: CPT | Mod: 26,,, | Performed by: RADIOLOGY

## 2022-09-20 PROCEDURE — 77067 SCR MAMMO BI INCL CAD: CPT | Mod: TC,PO

## 2022-09-20 PROCEDURE — 77067 MAMMO DIGITAL SCREENING BILAT WITH TOMO: ICD-10-PCS | Mod: 26,,, | Performed by: RADIOLOGY

## 2022-09-28 LAB
APTT IMM NP PPP: NORMAL SEC (ref 32–48)
APTT P HEP NEUT PPP: NORMAL SEC (ref 32–48)
CONFIRM APTT STACLOT: NORMAL
DRVVT SCREEN TO CONFIRM RATIO: NORMAL RATIO
LA 3 SCREEN W REFLEX-IMP: NORMAL
LA NT DPL PPP QL: NORMAL
MIXING DRVVT: NORMAL SEC (ref 33–44)
PROTHROMBIN TIME: 12.7 SEC (ref 12–15.5)
REPTILASE TIME: NORMAL SEC
SCREEN APTT: 38 SEC (ref 32–48)
SCREEN DRVVT: 33 SEC (ref 33–44)
THROMBIN TIME: NORMAL SEC (ref 14.7–19.5)

## 2022-10-01 LAB
ANTI-PM/SCL AB: <20 UNITS
ANTI-SS-A 52 KD AB, IGG: <20 UNITS
EJ AB SER QL: NEGATIVE
ENA JO1 AB SER IA-ACNC: <20 UNITS
ENA SM+RNP AB SER IA-ACNC: <20 UNITS
FIBRILLARIN (U3 RNP): NEGATIVE
KU AB SER QL: NEGATIVE
MDA-5 (P140): <20 UNITS
MI2 AB SER QL: NEGATIVE
NXP-2 (P140): <20 UNITS
OJ AB SER QL: NEGATIVE
PL12 AB SER QL: NEGATIVE
PL7 AB SER QL: NEGATIVE
SRP AB SERPL QL: NEGATIVE
TIF1 GAMMA (P155/140): <20 UNITS
U2 SNRNP: NEGATIVE

## 2022-12-02 DIAGNOSIS — M25.571 ACUTE RIGHT ANKLE PAIN: Primary | ICD-10-CM

## 2022-12-05 ENCOUNTER — OFFICE VISIT (OUTPATIENT)
Dept: ORTHOPEDICS | Facility: CLINIC | Age: 42
End: 2022-12-05
Payer: COMMERCIAL

## 2022-12-05 ENCOUNTER — HOSPITAL ENCOUNTER (OUTPATIENT)
Dept: RADIOLOGY | Facility: HOSPITAL | Age: 42
Discharge: HOME | End: 2022-12-05
Attending: ORTHOPAEDIC SURGERY
Payer: COMMERCIAL

## 2022-12-05 VITALS — BODY MASS INDEX: 28.88 KG/M2 | HEIGHT: 67 IN | WEIGHT: 184 LBS

## 2022-12-05 DIAGNOSIS — M25.571 ACUTE RIGHT ANKLE PAIN: ICD-10-CM

## 2022-12-05 DIAGNOSIS — M79.671 RIGHT FOOT PAIN: Primary | ICD-10-CM

## 2022-12-05 PROCEDURE — 3008F PR BODY MASS INDEX (BMI) DOCUMENTED: ICD-10-PCS | Mod: CPTII,S$GLB,, | Performed by: ORTHOPAEDIC SURGERY

## 2022-12-05 PROCEDURE — 1160F PR REVIEW ALL MEDS BY PRESCRIBER/CLIN PHARMACIST DOCUMENTED: ICD-10-PCS | Mod: CPTII,S$GLB,, | Performed by: ORTHOPAEDIC SURGERY

## 2022-12-05 PROCEDURE — 99999 PR PBB SHADOW E&M-EST. PATIENT-LVL III: CPT | Mod: PBBFAC,,, | Performed by: ORTHOPAEDIC SURGERY

## 2022-12-05 PROCEDURE — 73610 X-RAY EXAM OF ANKLE: CPT | Mod: TC,PO,RT

## 2022-12-05 PROCEDURE — 73610 XR ANKLE COMPLETE 3 VIEW RIGHT: ICD-10-PCS | Mod: 26,RT,, | Performed by: RADIOLOGY

## 2022-12-05 PROCEDURE — 1160F RVW MEDS BY RX/DR IN RCRD: CPT | Mod: CPTII,S$GLB,, | Performed by: ORTHOPAEDIC SURGERY

## 2022-12-05 PROCEDURE — 99213 PR OFFICE/OUTPT VISIT, EST, LEVL III, 20-29 MIN: ICD-10-PCS | Mod: S$GLB,,, | Performed by: ORTHOPAEDIC SURGERY

## 2022-12-05 PROCEDURE — 73630 X-RAY EXAM OF FOOT: CPT | Mod: TC,PO,RT

## 2022-12-05 PROCEDURE — 73610 X-RAY EXAM OF ANKLE: CPT | Mod: 26,RT,, | Performed by: RADIOLOGY

## 2022-12-05 PROCEDURE — 97760 PR ORTHOTIC MGMT&TRAINJ INITIAL ENC EA 15 MINS: ICD-10-PCS | Mod: GP,S$GLB,, | Performed by: ORTHOPAEDIC SURGERY

## 2022-12-05 PROCEDURE — 3008F BODY MASS INDEX DOCD: CPT | Mod: CPTII,S$GLB,, | Performed by: ORTHOPAEDIC SURGERY

## 2022-12-05 PROCEDURE — 1159F MED LIST DOCD IN RCRD: CPT | Mod: CPTII,S$GLB,, | Performed by: ORTHOPAEDIC SURGERY

## 2022-12-05 PROCEDURE — 99213 OFFICE O/P EST LOW 20 MIN: CPT | Mod: S$GLB,,, | Performed by: ORTHOPAEDIC SURGERY

## 2022-12-05 PROCEDURE — 73630 X-RAY EXAM OF FOOT: CPT | Mod: 26,RT,, | Performed by: RADIOLOGY

## 2022-12-05 PROCEDURE — 73630 XR FOOT COMPLETE 3 VIEW RIGHT: ICD-10-PCS | Mod: 26,RT,, | Performed by: RADIOLOGY

## 2022-12-05 PROCEDURE — 1159F PR MEDICATION LIST DOCUMENTED IN MEDICAL RECORD: ICD-10-PCS | Mod: CPTII,S$GLB,, | Performed by: ORTHOPAEDIC SURGERY

## 2022-12-05 PROCEDURE — 97760 ORTHOTIC MGMT&TRAING 1ST ENC: CPT | Mod: GP,S$GLB,, | Performed by: ORTHOPAEDIC SURGERY

## 2022-12-05 PROCEDURE — 99999 PR PBB SHADOW E&M-EST. PATIENT-LVL III: ICD-10-PCS | Mod: PBBFAC,,, | Performed by: ORTHOPAEDIC SURGERY

## 2022-12-05 NOTE — PROGRESS NOTES
42 years old has had pain in her right 2nd toe for about 1 months time no trauma difficulty with squatting being on her forefoot pain is 3 on the pain scale    Exam shows no outward signs of injury infection somewhat tender along the 2nd toe I cannot detect any instability, flexors and extensors are intact, some discomfort with pressing in the 1st webspace    X-rays are negative     Assessment:  Synovitis right 2nd toe     Plan:  Boot, symptomatic care, follow-up as needed     Patient seen as a consult from Dr. Darvin Arteaga, communication via epic    We performed a custom orthotic/brace fitting, adjusting and training with the patient. The patient demonstrated understanding and proper care. This was performed for 15 minutes.   Imaging studies ordered and reviewed by me    Further History  Aching pain  Worse with activity  Relieved with rest  No other associated symptoms  No other radiation    Further Exam  Alert and oriented  Pleasant  Contralateral limb has appropriate range of motion for age and condition  Contralateral limb has appropriate strength for age and condition  Contralateral limb has appropriate stability  for age and condition  No adenopathy  Pulses are appropriate for current condition  Skin is intact        Chief Complaint    Chief Complaint   Patient presents with    Right Foot - Pain       HPI  Oneida Villalba is a 42 y.o.  female who presents with       Past Medical History  Past Medical History:   Diagnosis Date    Heart murmur        Past Surgical History  No past surgical history on file.    Medications  Current Outpatient Medications   Medication Sig    EScitalopram oxalate (LEXAPRO) 10 MG tablet Take 10 mg by mouth once daily.    prenatal 21/iron fu/folic acid (PRENATAL COMPLETE ORAL) Take by mouth.    triamcinolone acetonide 0.025% (KENALOG) 0.025 % Oint Apply to affected areas of face twice daily as needed for irritation. Do not use for longer than 2 weeks in a row. (Patient not taking:  Reported on 2022)     No current facility-administered medications for this visit.       Allergies  Review of patient's allergies indicates:   Allergen Reactions    [place] Anaphylaxis, Blisters, Dermatitis, Hives, Itching, Rash and Swelling    Mangosteen peel Swelling       Family History  Family History   Problem Relation Age of Onset    No Known Problems Mother     No Known Problems Father     No Known Problems Sister     No Known Problems Brother     No Known Problems Maternal Aunt     No Known Problems Maternal Uncle     No Known Problems Paternal Aunt     No Known Problems Paternal Uncle     No Known Problems Maternal Grandmother     No Known Problems Maternal Grandfather     Cancer Paternal Grandmother     No Known Problems Paternal Grandfather     Amblyopia Neg Hx     Blindness Neg Hx     Cataracts Neg Hx     Glaucoma Neg Hx     Macular degeneration Neg Hx     Retinal detachment Neg Hx     Strabismus Neg Hx     Diabetes Neg Hx     Hypertension Neg Hx     Stroke Neg Hx     Thyroid disease Neg Hx        Social History  Social History     Socioeconomic History    Marital status:    Tobacco Use    Smoking status: Former     Types: Cigarettes     Quit date: 12/10/2003     Years since quittin.0    Smokeless tobacco: Never   Substance and Sexual Activity    Alcohol use: Yes    Drug use: No               Review of Systems     Constitutional: Negative    HENT: Negative  Eyes: Negative  Respiratory: Negative  Cardiovascular: Negative  Musculoskeletal: HPI  Skin: Negative  Neurological: Negative  Hematological: Negative  Endocrine: Negative                 Physical Exam    There were no vitals filed for this visit.  Body mass index is 28.82 kg/m².  Physical Examination:     General appearance -  well appearing, and in no distress  Mental status - awake  Neck - supple  Chest -  symmetric air entry  Heart - normal rate   Abdomen - soft      Assessment     1. Right foot pain          Plan

## 2023-07-10 DIAGNOSIS — M25.572 LEFT ANKLE PAIN, UNSPECIFIED CHRONICITY: Primary | ICD-10-CM

## 2023-08-31 ENCOUNTER — TELEPHONE (OUTPATIENT)
Dept: FAMILY MEDICINE | Facility: CLINIC | Age: 43
End: 2023-08-31
Payer: COMMERCIAL

## 2023-09-05 ENCOUNTER — TELEPHONE (OUTPATIENT)
Dept: ORTHOPEDICS | Facility: CLINIC | Age: 43
End: 2023-09-05
Payer: COMMERCIAL

## 2023-09-05 ENCOUNTER — HOSPITAL ENCOUNTER (OUTPATIENT)
Dept: RADIOLOGY | Facility: HOSPITAL | Age: 43
Discharge: HOME | End: 2023-09-05
Attending: ORTHOPAEDIC SURGERY
Payer: COMMERCIAL

## 2023-09-05 ENCOUNTER — PATIENT MESSAGE (OUTPATIENT)
Dept: ORTHOPEDICS | Facility: CLINIC | Age: 43
End: 2023-09-05
Payer: COMMERCIAL

## 2023-09-05 DIAGNOSIS — M25.572 LEFT ANKLE PAIN, UNSPECIFIED CHRONICITY: ICD-10-CM

## 2023-09-05 PROCEDURE — 73630 XR FOOT COMPLETE 3 VIEW LEFT: ICD-10-PCS | Mod: 26,LT,, | Performed by: RADIOLOGY

## 2023-09-05 PROCEDURE — 73630 X-RAY EXAM OF FOOT: CPT | Mod: TC,PO,LT

## 2023-09-05 PROCEDURE — 73610 X-RAY EXAM OF ANKLE: CPT | Mod: TC,PO,LT

## 2023-09-05 PROCEDURE — 73610 XR ANKLE COMPLETE 3 VIEW LEFT: ICD-10-PCS | Mod: 26,LT,, | Performed by: RADIOLOGY

## 2023-09-05 PROCEDURE — 73610 X-RAY EXAM OF ANKLE: CPT | Mod: 26,LT,, | Performed by: RADIOLOGY

## 2023-09-05 PROCEDURE — 73630 X-RAY EXAM OF FOOT: CPT | Mod: 26,LT,, | Performed by: RADIOLOGY

## 2023-09-07 ENCOUNTER — OFFICE VISIT (OUTPATIENT)
Dept: ORTHOPEDICS | Facility: CLINIC | Age: 43
End: 2023-09-07
Payer: COMMERCIAL

## 2023-09-07 VITALS — HEIGHT: 67 IN | BODY MASS INDEX: 28.88 KG/M2 | WEIGHT: 184 LBS

## 2023-09-07 DIAGNOSIS — M89.9 OSTEOCHONDRAL LESION OF TALAR DOME: Primary | ICD-10-CM

## 2023-09-07 DIAGNOSIS — M25.572 PAIN OF JOINT OF LEFT ANKLE AND FOOT: ICD-10-CM

## 2023-09-07 DIAGNOSIS — M94.9 OSTEOCHONDRAL LESION OF TALAR DOME: Primary | ICD-10-CM

## 2023-09-07 PROCEDURE — 1159F PR MEDICATION LIST DOCUMENTED IN MEDICAL RECORD: ICD-10-PCS | Mod: CPTII,S$GLB,, | Performed by: ORTHOPAEDIC SURGERY

## 2023-09-07 PROCEDURE — 99999 PR PBB SHADOW E&M-EST. PATIENT-LVL III: CPT | Mod: PBBFAC,,, | Performed by: ORTHOPAEDIC SURGERY

## 2023-09-07 PROCEDURE — 3008F PR BODY MASS INDEX (BMI) DOCUMENTED: ICD-10-PCS | Mod: CPTII,S$GLB,, | Performed by: ORTHOPAEDIC SURGERY

## 2023-09-07 PROCEDURE — 99204 PR OFFICE/OUTPT VISIT, NEW, LEVL IV, 45-59 MIN: ICD-10-PCS | Mod: S$GLB,,, | Performed by: ORTHOPAEDIC SURGERY

## 2023-09-07 PROCEDURE — 1160F RVW MEDS BY RX/DR IN RCRD: CPT | Mod: CPTII,S$GLB,, | Performed by: ORTHOPAEDIC SURGERY

## 2023-09-07 PROCEDURE — 99204 OFFICE O/P NEW MOD 45 MIN: CPT | Mod: S$GLB,,, | Performed by: ORTHOPAEDIC SURGERY

## 2023-09-07 PROCEDURE — 99999 PR PBB SHADOW E&M-EST. PATIENT-LVL III: ICD-10-PCS | Mod: PBBFAC,,, | Performed by: ORTHOPAEDIC SURGERY

## 2023-09-07 PROCEDURE — 3008F BODY MASS INDEX DOCD: CPT | Mod: CPTII,S$GLB,, | Performed by: ORTHOPAEDIC SURGERY

## 2023-09-07 PROCEDURE — 1159F MED LIST DOCD IN RCRD: CPT | Mod: CPTII,S$GLB,, | Performed by: ORTHOPAEDIC SURGERY

## 2023-09-07 PROCEDURE — 1160F PR REVIEW ALL MEDS BY PRESCRIBER/CLIN PHARMACIST DOCUMENTED: ICD-10-PCS | Mod: CPTII,S$GLB,, | Performed by: ORTHOPAEDIC SURGERY

## 2023-09-07 NOTE — PROGRESS NOTES
Status/Diagnosis: Left deltoid sprain +/- talar OCD  Date of Surgery: none  Date of Injury: July 2023  Return visit: Will call with MRI results  X-rays on Return: none    Chief Complaint:   Chief Complaint   Patient presents with    Left Ankle - Pain     Present History:  Oneida Villalba is a 43 y.o. female who presents today for new patient evaluation.  Patient reports what she describes as an inversion-type left ankle injury while walking down stairs 2+ months ago.  Immediate pain and swelling but still able to bear weight.  Pain has persisted despite self treatment.  Has been taking over-the-counter oral ibuprofen as needed for pain with minimal relief.  Denies any mechanical symptoms.  Denies any numbness or tingling.  No subjective ankle instability.  She does have problems going down stairs but not up stairs.  Also problems when trying to ambulate with heels on.  Otherwise healthy.  Denies tobacco use. Works as a realtor.      Past Medical History:   Diagnosis Date    Heart murmur        No past surgical history on file.    Current Outpatient Medications   Medication Sig    EScitalopram oxalate (LEXAPRO) 10 MG tablet Take 10 mg by mouth once daily.    prenatal 21/iron fu/folic acid (PRENATAL COMPLETE ORAL) Take by mouth.    triamcinolone acetonide 0.025% (KENALOG) 0.025 % Oint Apply to affected areas of face twice daily as needed for irritation. Do not use for longer than 2 weeks in a row.     No current facility-administered medications for this visit.       Review of patient's allergies indicates:   Allergen Reactions    [place] Anaphylaxis, Blisters, Dermatitis, Hives, Itching, Rash and Swelling    Mangosteen peel Swelling       Family History   Problem Relation Age of Onset    No Known Problems Mother     No Known Problems Father     No Known Problems Sister     No Known Problems Brother     No Known Problems Maternal Aunt     No Known Problems Maternal Uncle     No Known Problems Paternal Aunt     No Known  Problems Paternal Uncle     No Known Problems Maternal Grandmother     No Known Problems Maternal Grandfather     Cancer Paternal Grandmother     No Known Problems Paternal Grandfather     Amblyopia Neg Hx     Blindness Neg Hx     Cataracts Neg Hx     Glaucoma Neg Hx     Macular degeneration Neg Hx     Retinal detachment Neg Hx     Strabismus Neg Hx     Diabetes Neg Hx     Hypertension Neg Hx     Stroke Neg Hx     Thyroid disease Neg Hx        Social History     Socioeconomic History    Marital status:    Tobacco Use    Smoking status: Former     Current packs/day: 0.00     Types: Cigarettes     Quit date: 12/10/2003     Years since quittin.7    Smokeless tobacco: Never   Substance and Sexual Activity    Alcohol use: Yes    Drug use: No       Physical exam:  There were no vitals filed for this visit.  Body mass index is 28.82 kg/m².  General: In no apparent distress; well developed and well nourished.  HEENT: normocephalic; atraumatic.  Cardiovascular: regular rate.  Respiratory: no increased work of breathing.  Musculoskeletal:   Gait: mild antalgic  Inspection:   No gross abnormality noted.  Patient localizes pain along the medial aspect of the ankle.  Mild-to-moderate tenderness just distal to the tip of the medial malleolus.  Also with moderate tenderness over the anteromedial ankle joint line.  Minimal tenderness on the anterolateral joint line.  No tenderness over the ATFL/CFL.  No damir laxity with anterior drawer or external rotation anterior drawer testing.  No pain referable to the foot.  Silfverskiold:  Negative  Alignment:  Knee: neutral               Ankle: neutral              Hindfoot: neutral              Forefoot: neutral   Strength:              Dorsiflexion 5/5  Plantar flexion 5/5  Inversion 5/5   Eversion 5/5   Sensation:              SILT distally   ROM:              Ankle: Full and painless.              Subtalar: Painless inversion and eversion   Pulses: 2+ DP/PT pulses.                    Imaging Studies/Outside documentation:  I have ordered/reviewed/interpreted the following images/outside documentation:  1. Weight bearing 3-views of Left foot and ankle:   On my independent review, no acute bony abnormality noted.  Mild increased calcaneal pitch.  Subtle joint space narrowing at the subtalar joint.  Moderate hallux valgus and concomitant metatarsus adductus.  Ankle mortise remains congruent.  No significant degenerative joint changes.        Assessment:  Oneida Villalba is a 43 y.o. female with Left deltoid sprain +/- talar OCD.     Plan:   Clinical and radiographic findings were discussed.  Given the location of her pain on exam today, high clinical suspicion for medial talar dome osteochondral lesion.  We will obtain MRI without contrast for further evaluation.  We will contact patient with results once complete.  This will dictate treatment plan going forward.  Patient voiced understanding.  All questions were answered.    This note was created using voice recognition software and may contain grammatical errors.

## 2023-09-15 ENCOUNTER — HOSPITAL ENCOUNTER (OUTPATIENT)
Dept: RADIOLOGY | Facility: HOSPITAL | Age: 43
Discharge: HOME | End: 2023-09-15
Attending: ORTHOPAEDIC SURGERY
Payer: COMMERCIAL

## 2023-09-15 DIAGNOSIS — M25.572 PAIN OF JOINT OF LEFT ANKLE AND FOOT: ICD-10-CM

## 2023-09-15 DIAGNOSIS — M89.9 OSTEOCHONDRAL LESION OF TALAR DOME: ICD-10-CM

## 2023-09-15 DIAGNOSIS — M94.9 OSTEOCHONDRAL LESION OF TALAR DOME: ICD-10-CM

## 2023-09-15 PROCEDURE — 73721 MRI JNT OF LWR EXTRE W/O DYE: CPT | Mod: TC,PO,LT

## 2023-09-15 PROCEDURE — 73721 MRI JNT OF LWR EXTRE W/O DYE: CPT | Mod: 26,LT,, | Performed by: RADIOLOGY

## 2023-09-15 PROCEDURE — 73721 MRI ANKLE WITHOUT CONTRAST LEFT: ICD-10-PCS | Mod: 26,LT,, | Performed by: RADIOLOGY

## 2023-09-20 ENCOUNTER — PATIENT MESSAGE (OUTPATIENT)
Dept: ORTHOPEDICS | Facility: CLINIC | Age: 43
End: 2023-09-20
Payer: COMMERCIAL

## 2023-09-25 ENCOUNTER — TELEPHONE (OUTPATIENT)
Dept: FAMILY MEDICINE | Facility: CLINIC | Age: 43
End: 2023-09-25
Payer: COMMERCIAL

## 2023-09-25 NOTE — TELEPHONE ENCOUNTER
----- Message from Alicia Lew sent at 9/25/2023 10:47 AM CDT -----  Type:  Appointment Request    Caller is requesting an appointment.      Name of Caller:  Pt    Symptoms:  flu shot    Would the patient rather a call back or a response via [facility]?  Call back    Best Call Back Number:  [phone]    Additional Information:  Pt is wanting to get flu shot in [place].  Please call back to advise. Thanks!

## 2023-09-28 ENCOUNTER — HOSPITAL ENCOUNTER (OUTPATIENT)
Dept: RADIOLOGY | Facility: HOSPITAL | Age: 43
Discharge: HOME | End: 2023-09-28
Attending: FAMILY MEDICINE
Payer: COMMERCIAL

## 2023-09-28 DIAGNOSIS — Z12.31 OTHER SCREENING MAMMOGRAM: ICD-10-CM

## 2023-09-28 PROCEDURE — 77067 SCR MAMMO BI INCL CAD: CPT | Mod: TC,PO

## 2023-09-28 PROCEDURE — 77063 BREAST TOMOSYNTHESIS BI: CPT | Mod: 26,,, | Performed by: RADIOLOGY

## 2023-09-28 PROCEDURE — 77067 MAMMO DIGITAL SCREENING BILAT WITH TOMO: ICD-10-PCS | Mod: 26,,, | Performed by: RADIOLOGY

## 2023-09-28 PROCEDURE — 77067 SCR MAMMO BI INCL CAD: CPT | Mod: 26,,, | Performed by: RADIOLOGY

## 2023-09-28 PROCEDURE — 77063 MAMMO DIGITAL SCREENING BILAT WITH TOMO: ICD-10-PCS | Mod: 26,,, | Performed by: RADIOLOGY

## 2023-10-05 ENCOUNTER — CLINICAL SUPPORT (OUTPATIENT)
Dept: REHABILITATION | Facility: HOSPITAL | Age: 43
End: 2023-10-05
Attending: ORTHOPAEDIC SURGERY
Payer: COMMERCIAL

## 2023-10-05 DIAGNOSIS — R26.89 DECREASED FUNCTIONAL MOBILITY: ICD-10-CM

## 2023-10-05 DIAGNOSIS — M62.81 MUSCLE WEAKNESS: ICD-10-CM

## 2023-10-05 DIAGNOSIS — S93.422A SPRAIN OF DELTOID LIGAMENT OF LEFT ANKLE, INITIAL ENCOUNTER: ICD-10-CM

## 2023-10-05 DIAGNOSIS — R26.89 IMPAIRMENT OF BALANCE: ICD-10-CM

## 2023-10-05 DIAGNOSIS — M25.572 ACUTE LEFT ANKLE PAIN: ICD-10-CM

## 2023-10-05 PROCEDURE — 97110 THERAPEUTIC EXERCISES: CPT | Mod: PN

## 2023-10-05 PROCEDURE — 97161 PT EVAL LOW COMPLEX 20 MIN: CPT | Mod: PN

## 2023-10-05 NOTE — PLAN OF CARE
[facility]  Physical Therapy Initial Evaluation    Name: Oneida Villalba  Clinic Number: [id number]    Therapy Diagnosis:   Encounter Diagnoses   Name Primary?    Sprain of deltoid ligament of left ankle, initial encounter     Acute left ankle pain     Muscle weakness     Impairment of balance     Decreased functional mobility      Physician: Kane Nunez MD    Physician Orders: PT Eval and Treat   Medical Diagnosis from Referral: Sprain of deltoid ligament of left ankle, initial encounter  Evaluation Date: 10/5/2023  Authorization Period Expiration: 11/1/23  Plan of Care Expiration: 12/1/23  Visit # / Visits authorized: 1/16    Time In: 9:15  Time Out: 10:00  Total Billable Time: 45 minutes    Precautions: Standard    Subjective   Date of onset: July 2023  History of current condition - Oneida reports: she rolled her ankle in July (inversion).  She still has pain with descending stairs, and at night.  She has pain with prolonged standing.  The pain is in medial L ankle.    She was doing TM, squats, lunges     Medical History:   Past Medical History:   Diagnosis Date    Heart murmur        Surgical History:   Oneida Villalba  has no past surgical history on file.    Medications:   Oneida has a current medication list which includes the following prescription(s): escitalopram oxalate, prenatal 21/iron fu/folic acid, and triamcinolone acetonide 0.025%.    Allergies:   Review of patient's allergies indicates:   Allergen Reactions    [place] Anaphylaxis, Blisters, Dermatitis, Hives, Itching, Rash and Swelling    Mangosteen peel Swelling        Imaging, MRI studies: 1. Signal within the medial band of the plantar fascia as can be seen with plantar fasciitis that is mild.  2. Some minimal Achilles signal at its insertion on the calcaneus that could relate to minimal Achilles tendinopathy with some retrocalcaneal bursal fluid or fluid related to Achilles strain.  3. A moderate to large posterior  talocalcaneal joint effusion is noted along with joint fluid or ganglion formation at the talonavicular articulation dorsally  4. Low-grade tenosynovitis is suspected particularly in the posterior tibial tendon, flexor digitorum tendon, and peroneus tendons the level the ankle.  Some fluid surrounds the flexor hallucis tendon as well that could relate to joint fluid or tenosynovitis.  5. Slightly more signal is noted than would be expected within the sinus tarsi, please correlate for a sinus tarsi type syndrome.  This is only mild signal abnormality  6. The anterior talofibular ligament appears somewhat diminutive without significant increased T2 signal to suggest acute tear.  This could relate to history of injury    Prior Therapy: PT for elbow  Social History: pt lives with their family; 2 story house  Occupation:   Prior Level of Function: I with ADLs, able to work out without pain  Current Level of Function: pain with stairs, wearing high heels, prolonged standing (1 hr)    Pain:  Current 1/10, worst 4/10, best 0/10   Location:  medial L ankle  Description: Aching and Dull  Aggravating Factors: stairs, standing  Easing Factors: rest, pool, ibuprofen    Pts goals: be able to do stairs, stand/walk, exercise without pain    Objective       Observation: pt is pleasant and cooperative    Posture: neutral foot    Ankle Range of Motion: AROM   Ankle Left Right   Dorsiflexion 2 (6 knee flex) 1 (2 knee flex)   Plantarflexion 67  70    Inversion 26  30    Eversion 11  9        Lower Extremity Strength  Left LE  Right LE    Knee extension: 5/5 Knee extension: 5/5   Knee flexion: 5/5 Knee flexion: 5/5   Hip flexion: 5/5 Hip flexion: 5/5   Hip extension:  4/5 Hip extension: 4/5   Hip abduction: 4+/5 Hip abduction: 4+/5   Hip adduction: 5/5 Hip adduction 5/5   Ankle dorsiflexion: 4+/5 Ankle dorsiflexion: 5/5   Ankle plantarflexion: 4+/5 Ankle plantarflexion: 5/5   Ankle inversion 4+/5 Ankle inversion 5/5    Ankle eversion 4+/5 Ankle eversion 5/5       Special Tests:  AC Joint Left Right   Anterior Drawer Test - -       SLS: R: 30 sec, L: 30 sec fair balance strategies    Joint Mobility: WFL B for talocrural and subtalar mobility    Palpation: TTP to L posterior tib tendon    Sensation: grossly intact to light touch      Intake Outcome Measure for FOTO ankle Survey  Therapist reviewed FOTO scores for Oneida Villalba on 10/5/2023.  FOTO report- see Media section or FOTO account episode details.  Intake Score : 67%         TREATMENT   Treatment Time In: 9:50  Treatment Time Out: 10:00  Total Treatment time separate from Evaluation: 10 minutes    Oneida received therapeutic exercises to develop strength, ROM, and flexibility for 8 minutes including:  L ankle strengthening (DF, Inv, marisol) x15 ea GTB  Standing heel raises x10  Standing Gastroc stretch 3x20 sec  Standing posterior tib stretch    May add: arch lifts, bridges, SL hip abd, hip abd walk, manual to post tib as needed    Oneida participated in neuromuscular re-education activities to improve: Balance, Kinesthetic, Sense, and Proprioception for 2 minutes. The following activities were included:  SLS 2x30 sec ea    May add: SLS on foam, RDLs, weight shifts and balance on fitter board      Home Exercises and Patient Education Provided    Education provided:   - role of PT  -wear supportive shoes for walking and work outs  Pt gave verbal understanding to all education provided     Written Home Exercises Provided: yes.  Exercises were reviewed and Oneida was able to demonstrate them prior to the end of the session.  Oneida demonstrated good  understanding of the education provided.     See EMR under Patient Instructions for exercises provided 10/5/23.    Assessment   Oneida is a 43 y.o. female referred to outpatient Physical Therapy with a medical diagnosis of Sprain of deltoid ligament of left ankle, initial encounter. Pt presents with decreased L ankle strength,  decreased SLS, TTP to L posterior tibialis.  She will benefit from L ankle strengthening, arch stabilization, balance and proprioceptive training for improved functional mobility.    Pt prognosis is Excellent.   Pt will benefit from skilled outpatient Physical Therapy to address the deficits stated above and in the chart below, provide pt/family education, and to maximize pt's level of independence.     Plan of care discussed with patient: Yes  Pt's spiritual, cultural and educational needs considered and patient is agreeable to the plan of care and goals as stated below:     Anticipated Barriers for therapy: none    Medical Necessity is demonstrated by the following  History  Co-morbidities and personal factors that may impact the plan of care [x] LOW: no personal factors / co-morbidities  [] MODERATE: 1-2 personal factors / co-morbidities  [] HIGH: 3+ personal factors / co-morbidities    Moderate / High Support Documentation:   Co-morbidities affecting plan of care:      Personal Factors:   no deficits     Examination  Body Structures and Functions, activity limitations and participation restrictions that may impact the plan of care [] LOW: addressing 1-2 elements  [x] MODERATE: 3+ elements  [] HIGH: 4+ elements (please support below)    Moderate / High Support Documentation: decreased LE strength, decreased balance, pain with ambulation     Clinical Presentation [x] LOW: stable  [] MODERATE: Evolving  [] HIGH: Unstable     Decision Making/ Complexity Score: low         GOALS: Short Term Goals:  4 weeks (progressing, not met)  1.Report decreased    L ankle    pain  <   / =  3  /10 at its worst  to increase tolerance for ADLs  2. Pt will perform L SLS with good balance strategies for increased ease ambulating on uneven surfaces.  3. Increased LE strength by 1/3 muscle grade  to increase tolerance for ADL and work activities.  4. Pt will report ambulating x30 min on treadmill without pain.  5. Pt to tolerate HEP to  improve ROM and independence with ADL's    Long Term Goals: 8 weeks (progressing, not met)  1.Report decreased    L ankle    pain  <   / =  1  /10  to increase tolerance for ADLs  2.pt will perform squats with 20# KB (2x10 reps) without pain for increased tolerance for exercising at gym.  3.Increased LE strength by 1 muscle grade  to increase tolerance for ADL and work activities.  4.Pt to report ability to walk x1hr on treadmill without pain.  5. Pt to be Independent with HEP to improve ROM and independence with ADL's      Plan   Plan of care Certification: 10/5/2023 to 12/1/23.    Outpatient Physical Therapy 1 times weekly for 8 weeks to include the following interventions: Electrical Stimulation  , Gait Training, Manual Therapy, Moist Heat/ Ice, Neuromuscular Re-ed, Patient Education, Self Care, Therapeutic Activities, Therapeutic Exercise, Ultrasound, and dry needling.     Maida Kendrick, PT

## 2023-10-09 ENCOUNTER — CLINICAL SUPPORT (OUTPATIENT)
Dept: REHABILITATION | Facility: HOSPITAL | Age: 43
End: 2023-10-09
Payer: COMMERCIAL

## 2023-10-09 DIAGNOSIS — M25.572 ACUTE LEFT ANKLE PAIN: Primary | ICD-10-CM

## 2023-10-09 DIAGNOSIS — R26.89 DECREASED FUNCTIONAL MOBILITY: ICD-10-CM

## 2023-10-09 DIAGNOSIS — M62.81 MUSCLE WEAKNESS: ICD-10-CM

## 2023-10-09 DIAGNOSIS — R26.89 IMPAIRMENT OF BALANCE: ICD-10-CM

## 2023-10-09 PROCEDURE — 97112 NEUROMUSCULAR REEDUCATION: CPT | Mod: PN

## 2023-10-09 PROCEDURE — 97110 THERAPEUTIC EXERCISES: CPT | Mod: PN

## 2023-10-09 NOTE — PROGRESS NOTES
[facility]   Physical Therapy Treatment Note      Name: Oneida Villalba  Clinic Number: [id number]    Therapy Diagnosis:   Encounter Diagnoses   Name Primary?    Acute left ankle pain Yes    Muscle weakness     Impairment of balance     Decreased functional mobility      Physician: Kane Nunez MD    Visit Date: 10/9/2023    Physician Orders: PT Eval and Treat   Medical Diagnosis from Referral: Sprain of deltoid ligament of left ankle, initial encounter  Evaluation Date: 10/5/2023  Authorization Period Expiration: 11/1/23  Plan of Care Expiration: 12/1/23  Visit # / Visits authorized: 2/16    Time In: 1:00  Time Out: 1:45  Total Billable Time: 45 minutes    Precautions: Standard    PTA Visit #: 0/5       Subjective     Patient reports: she has some soreness.  She states it has been easier getting up/down the stairs.  Pt reports some pain to R low back and buttock with rolling.  She was compliant with home exercise program.  Response to previous treatment: did well  Functional change: increased ease up/down stairs    Pain: 1-2/10  Location: left ankle     Objective      Standing: R iliac crest elevated, R PSIS elevated    Treatment     Oneida received the treatments listed below:      therapeutic exercises to develop strength, ROM, flexibility, and core stabilization for 25 minutes including:  L ankle strengthening (DF, Inv, marisol) x20 ea GTB  Standing heel raises x10  Standing Gastroc stretch 3x20 sec  Seated posterior tib stretch  Push/pull 3x3 sec  Piriformis stretch 3x20 sec  Bridges x20, 5 sec hold  SL hip abd 2x10 ea  Hip abd walk Y sport loop 3 laps in // bars    May add: manual to post tib as needed    Oneida participated in neuromuscular re-education activities to improve: Balance, Kinesthetic, Sense, and Proprioception for 15 minutes. The following activities were included:  SLS 2x30 sec ea  Seated arch lifts x10, 5 sec hold  weight shifts (A/P, lateral) x10 ea  balance on fitter  board x30 sec ea position    May add: SLS on foam, RDLs     manual therapy techniques: Soft tissue Mobilization were applied to the: L ankle for 5 minutes, including:  STM to L posterior tib      Patient Education and Home Exercises       Education provided:   - SIJ anatomy  Pt gave verbal understanding to all education provided     Written Home Exercises Provided: yes. Exercises were reviewed and Oneida was able to demonstrate them prior to the end of the session.  Oneida demonstrated good  understanding of the education provided. See Electronic Medical Record under Patient Instructions for exercises provided during therapy sessions    Assessment     Pt presented with R anterior innominate which corrected with push/pull.  Glute strengthening and arch lifts were challenging, but pt able to perform all exercises without exacerbation of pain.  She will continue to benefit from PT for improved LE strength, balance and ankle stability.    Oneida Is progressing well towards her goals.   Patient prognosis is Good.     Patient will continue to benefit from skilled outpatient physical therapy to address the deficits listed in the problem list box on initial evaluation, provide pt/family education and to maximize pt's level of independence in the home and community environment.     Patient's spiritual, cultural and educational needs considered and pt agreeable to plan of care and goals.     Anticipated barriers to physical therapy: none    Goals:   Short Term Goals:  4 weeks (progressing, not met)  1.Report decreased    L ankle    pain  <   / =  3  /10 at its worst  to increase tolerance for ADLs  2. Pt will perform L SLS with good balance strategies for increased ease ambulating on uneven surfaces.  3. Increased LE strength by 1/3 muscle grade  to increase tolerance for ADL and work activities.  4. Pt will report ambulating x30 min on treadmill without pain.  5. Pt to tolerate HEP to improve ROM and independence with  ADL's    Long Term Goals: 8 weeks (progressing, not met)  1.Report decreased    L ankle    pain  <   / =  1  /10  to increase tolerance for ADLs  2.pt will perform squats with 20# KB (2x10 reps) without pain for increased tolerance for exercising at gym.  3.Increased LE strength by 1 muscle grade  to increase tolerance for ADL and work activities.  4.Pt to report ability to walk x1hr on treadmill without pain.  5. Pt to be Independent with HEP to improve ROM and independence with ADL's      Plan     Continue PT towards established goals.  Progress LE strengthening and balance/proprioceptive training as tolerated.    Plan of care Certification: 10/5/2023 to 12/1/23.    Outpatient Physical Therapy 1 times weekly for 8 weeks to include the following interventions: Electrical Stimulation  , Gait Training, Manual Therapy, Moist Heat/ Ice, Neuromuscular Re-ed, Patient Education, Self Care, Therapeutic Activities, Therapeutic Exercise, Ultrasound, and dry needling.     Maida Kendrick, PT

## 2023-10-16 ENCOUNTER — CLINICAL SUPPORT (OUTPATIENT)
Dept: REHABILITATION | Facility: HOSPITAL | Age: 43
End: 2023-10-16
Payer: COMMERCIAL

## 2023-10-16 DIAGNOSIS — R26.89 DECREASED FUNCTIONAL MOBILITY: ICD-10-CM

## 2023-10-16 DIAGNOSIS — R26.89 IMPAIRMENT OF BALANCE: ICD-10-CM

## 2023-10-16 DIAGNOSIS — M62.81 MUSCLE WEAKNESS: ICD-10-CM

## 2023-10-16 DIAGNOSIS — M25.572 ACUTE LEFT ANKLE PAIN: Primary | ICD-10-CM

## 2023-10-16 PROCEDURE — 97110 THERAPEUTIC EXERCISES: CPT | Mod: PN,CQ

## 2023-10-16 PROCEDURE — 97112 NEUROMUSCULAR REEDUCATION: CPT | Mod: PN,CQ

## 2023-10-16 PROCEDURE — 97140 MANUAL THERAPY 1/> REGIONS: CPT | Mod: PN,CQ

## 2023-10-16 NOTE — PROGRESS NOTES
[facility]   Physical Therapy Treatment Note      Name: Oneida Villalba  Clinic Number: [id number]    Therapy Diagnosis:   Encounter Diagnoses   Name Primary?    Acute left ankle pain Yes    Muscle weakness     Impairment of balance     Decreased functional mobility        Physician: Kane Nunez MD    Visit Date: 10/16/2023    Physician Orders: PT Eval and Treat   Medical Diagnosis from Referral: Sprain of deltoid ligament of left ankle, initial encounter  Evaluation Date: 10/5/2023  Authorization Period Expiration: 11/1/23  Plan of Care Expiration: 12/1/23  Visit # / Visits authorized: 3/21    Time In: 11:30 am  Time Out: 12:20 pm  Total Billable Time: 50 minutes    Precautions: Standard    PTA Visit #: 1/5       Subjective     Patient reports: some soreness into the ankle from doing her exercises but overall is making improvements. Still some posterior ankle tightness with going down stairs.   She was compliant with home exercise program.  Response to previous treatment: felt good  Functional change: increased ease up/down stairs, down still challenging    Pain: 1-2/10  Location: left ankle     Objective      Standing: R iliac crest elevated, R PSIS elevated    Treatment     Oneida received the treatments listed below:      therapeutic exercises to develop strength, ROM, flexibility, and core stabilization for 25 minutes including:  L ankle strengthening (DF, Inv, marisol) x20 ea GTB  Piriformis stretch 3x20 sec  Bridges x20, 5 sec hold  SL hip abd 2x10 ea  Push/pull 3x3 sec  Seated posterior tib stretch  Standing heel raises x10  Standing Gastroc stretch 3x20 sec  Hip abd walk Y sport loop 3 laps in // bars    Oneida participated in neuromuscular re-education activities to improve: Balance, Kinesthetic, Sense, and Proprioception for 17 minutes. The following activities were included:  Seated arch lifts x10, 5 sec hold, progressed to standing arch lifts 10 x 5 sec hold  Seated towel pulls  "into eversion, 1# with heel on 2" step x 3 trials  weight shifts (A/P, lateral) x10 ea  SLS 2x30 sec ea  balance on fitter board x30 sec ea position    May add: SLS on foam, RDLs     manual therapy techniques: Soft tissue Mobilization were applied to the: L ankle for 08 minutes, including:  STM to L posterior tib  L ankle distraction into manual dorsiflexion and posterior tib stretch      Patient Education and Home Exercises       Education provided:   - SIJ anatomy  Pt gave verbal understanding to all education provided     Written Home Exercises Provided: yes. Exercises were reviewed and Oneida was able to demonstrate them prior to the end of the session.  Oneida demonstrated good  understanding of the education provided. See Electronic Medical Record under Patient Instructions for exercises provided during therapy sessions    Assessment     Pt tolerated progression of treatment well this date without symptom provocation. Challenged initially for proper technique of arch doming but did improve with repetition and able to progress to standing. Good SLS stability with cue for arch doming there as well. Still some L low back/SI discomfort which seems to be manageable with push/pull. She will continue to benefit from PT for improved LE strength, balance and ankle stability.    Oneida Is progressing well towards her goals.   Patient prognosis is Good.     Patient will continue to benefit from skilled outpatient physical therapy to address the deficits listed in the problem list box on initial evaluation, provide pt/family education and to maximize pt's level of independence in the home and community environment.     Patient's spiritual, cultural and educational needs considered and pt agreeable to plan of care and goals.     Anticipated barriers to physical therapy: none    Goals:   Short Term Goals:  4 weeks (progressing, not met)  1.Report decreased    L ankle    pain  <   / =  3  /10 at its worst  to increase " tolerance for ADLs  2. Pt will perform L SLS with good balance strategies for increased ease ambulating on uneven surfaces.  3. Increased LE strength by 1/3 muscle grade  to increase tolerance for ADL and work activities.  4. Pt will report ambulating x30 min on treadmill without pain.  5. Pt to tolerate HEP to improve ROM and independence with ADL's    Long Term Goals: 8 weeks (progressing, not met)  1.Report decreased    L ankle    pain  <   / =  1  /10  to increase tolerance for ADLs  2.pt will perform squats with 20# KB (2x10 reps) without pain for increased tolerance for exercising at gym.  3.Increased LE strength by 1 muscle grade  to increase tolerance for ADL and work activities.  4.Pt to report ability to walk x1hr on treadmill without pain.  5. Pt to be Independent with HEP to improve ROM and independence with ADL's      Plan     Continue PT towards established goals.  Progress LE strengthening and balance/proprioceptive training as tolerated.    Plan of care Certification: 10/5/2023 to 12/1/23.    Outpatient Physical Therapy 1 times weekly for 8 weeks to include the following interventions: Electrical Stimulation  , Gait Training, Manual Therapy, Moist Heat/ Ice, Neuromuscular Re-ed, Patient Education, Self Care, Therapeutic Activities, Therapeutic Exercise, Ultrasound, and dry needling.     Serena Prado, PTA

## 2023-10-23 ENCOUNTER — CLINICAL SUPPORT (OUTPATIENT)
Dept: REHABILITATION | Facility: HOSPITAL | Age: 43
End: 2023-10-23
Payer: COMMERCIAL

## 2023-10-23 DIAGNOSIS — R26.89 IMPAIRMENT OF BALANCE: ICD-10-CM

## 2023-10-23 DIAGNOSIS — M62.81 MUSCLE WEAKNESS: ICD-10-CM

## 2023-10-23 DIAGNOSIS — R26.89 DECREASED FUNCTIONAL MOBILITY: ICD-10-CM

## 2023-10-23 DIAGNOSIS — M25.572 ACUTE LEFT ANKLE PAIN: Primary | ICD-10-CM

## 2023-10-23 PROCEDURE — 97112 NEUROMUSCULAR REEDUCATION: CPT | Mod: PN,CQ

## 2023-10-23 PROCEDURE — 97140 MANUAL THERAPY 1/> REGIONS: CPT | Mod: PN,CQ

## 2023-10-23 PROCEDURE — 97110 THERAPEUTIC EXERCISES: CPT | Mod: PN,CQ

## 2023-10-23 NOTE — PROGRESS NOTES
[facility]   Physical Therapy Treatment Note      Name: Oneida Villalba  Clinic Number: [id number]    Therapy Diagnosis:   Encounter Diagnoses   Name Primary?    Acute left ankle pain Yes    Muscle weakness     Impairment of balance     Decreased functional mobility        Physician: Kane Nunez MD    Visit Date: 10/23/2023    Physician Orders: PT Eval and Treat   Medical Diagnosis from Referral: Sprain of deltoid ligament of left ankle, initial encounter  Evaluation Date: 10/5/2023  Authorization Period Expiration: 11/1/23  Plan of Care Expiration: 12/1/23  Visit # / Visits authorized: 4/21    Time In: 1:48 am  Time Out: 2:33 pm  Total Billable Time: 45 minutes    Precautions: Standard    PTA Visit #: 2/5       Subjective     Patient reports: doing better with less pain and discomfort into the left ankle. Has been tolerating increased duration of walking and  some soreness into the ankle from doing her exercises but overall is making improvements. Still some posterior ankle tightness with going down stairs.   She was compliant with home exercise program.  Response to previous treatment: felt good  Functional change: increased ease up/down stairs, down still challenging    Pain: 1-2/10  Location: left ankle     Objective      Standing: R iliac crest elevated, R PSIS elevated    Treatment     Oneida received the treatments listed below:      therapeutic exercises to develop strength, ROM, flexibility, and core stabilization for 25 minutes including:  L ankle strengthening (DF, Inv, marisol) x20 ea GTB  Piriformis stretch 3x20 sec  Bridges x20, 5 sec hold  Supine sural nerve glide (knee extends with foot inverted and dorsiflexed) x 10 on L  SL hip abd 2x10 ea  Push/pull 3x3 sec  Seated posterior tib stretch  Standing heel raises 2x10  Standing Gastroc stretch 3x20 sec  Hip abd walk Y sport loop 3 laps in // bars    Oneida participated in neuromuscular re-education activities to improve:  "Balance, Kinesthetic, Sense, and Proprioception for 17 minutes. The following activities were included:  Seated towel crunches 20 x 3s hold  Seated arch lifts x10, 5 sec hold, progressed to standing arch lifts 10 x 5 sec hold  Seated towel pulls into eversion, 1# with heel on 2" step x 3 trials  weight shifts (A/P, lateral) x10 ea  SLS x30 sec, on foam x 30 sec  balance on fitter board x30 sec fwd/bkwd double leg and single leg (L)    May add: SLS on foam, RDLs     manual therapy techniques: Soft tissue Mobilization were applied to the: L ankle for 08 minutes, including:  STM to L posterior tib  L ankle distraction into manual dorsiflexion and posterior tib stretch      Patient Education and Home Exercises       Education provided:   - SIJ anatomy  Pt gave verbal understanding to all education provided     Written Home Exercises Provided: yes. Exercises were reviewed and Oneida was able to demonstrate them prior to the end of the session.  Oneida demonstrated good  understanding of the education provided. See Electronic Medical Record under Patient Instructions for exercises provided during therapy sessions    Assessment     Pt presents to clinic with improving ankle mobility and decreased tightness. Still remains challenged with foot intrinsic activation but is improving slowly. Greater stabilization with balance activities, able to progress to foam and single leg fitter board rocks. Still occasional L low back/SI discomfort which seems to be manageable with push/pull. She will continue to benefit from PT for improved LE strength, balance and ankle stability.    Oneida Is progressing well towards her goals.   Patient prognosis is Good.     Patient will continue to benefit from skilled outpatient physical therapy to address the deficits listed in the problem list box on initial evaluation, provide pt/family education and to maximize pt's level of independence in the home and community environment.     Patient's " spiritual, cultural and educational needs considered and pt agreeable to plan of care and goals.     Anticipated barriers to physical therapy: none    Goals:   Short Term Goals:  4 weeks (progressing, not met)  1.Report decreased    L ankle    pain  <   / =  3  /10 at its worst  to increase tolerance for ADLs  2. Pt will perform L SLS with good balance strategies for increased ease ambulating on uneven surfaces.  3. Increased LE strength by 1/3 muscle grade  to increase tolerance for ADL and work activities.  4. Pt will report ambulating x30 min on treadmill without pain.  5. Pt to tolerate HEP to improve ROM and independence with ADL's    Long Term Goals: 8 weeks (progressing, not met)  1.Report decreased    L ankle    pain  <   / =  1  /10  to increase tolerance for ADLs  2.pt will perform squats with 20# KB (2x10 reps) without pain for increased tolerance for exercising at gym.  3.Increased LE strength by 1 muscle grade  to increase tolerance for ADL and work activities.  4.Pt to report ability to walk x1hr on treadmill without pain.  5. Pt to be Independent with HEP to improve ROM and independence with ADL's      Plan     Continue PT towards established goals.  Progress LE strengthening and balance/proprioceptive training as tolerated.    Plan of care Certification: 10/5/2023 to 12/1/23.    Outpatient Physical Therapy 1 times weekly for 8 weeks to include the following interventions: Electrical Stimulation  , Gait Training, Manual Therapy, Moist Heat/ Ice, Neuromuscular Re-ed, Patient Education, Self Care, Therapeutic Activities, Therapeutic Exercise, Ultrasound, and dry needling.     Serena Prado, PTA

## 2023-11-06 ENCOUNTER — LAB VISIT (OUTPATIENT)
Dept: LAB | Facility: HOSPITAL | Age: 43
End: 2023-11-06
Attending: FAMILY MEDICINE
Payer: COMMERCIAL

## 2023-11-06 ENCOUNTER — OFFICE VISIT (OUTPATIENT)
Dept: FAMILY MEDICINE | Facility: CLINIC | Age: 43
End: 2023-11-06
Payer: COMMERCIAL

## 2023-11-06 VITALS
OXYGEN SATURATION: 98 % | WEIGHT: 190.5 LBS | HEART RATE: 79 BPM | TEMPERATURE: 98 F | DIASTOLIC BLOOD PRESSURE: 82 MMHG | HEIGHT: 67 IN | SYSTOLIC BLOOD PRESSURE: 118 MMHG | BODY MASS INDEX: 29.9 KG/M2

## 2023-11-06 DIAGNOSIS — Z00.00 WELL ADULT EXAM: Primary | ICD-10-CM

## 2023-11-06 DIAGNOSIS — Z23 NEED FOR TDAP VACCINATION: ICD-10-CM

## 2023-11-06 DIAGNOSIS — Z12.11 COLON CANCER SCREENING: ICD-10-CM

## 2023-11-06 DIAGNOSIS — M25.552 LEFT HIP PAIN: ICD-10-CM

## 2023-11-06 DIAGNOSIS — Z00.00 WELL ADULT EXAM: ICD-10-CM

## 2023-11-06 PROBLEM — R50.9 FEVER OF UNKNOWN ORIGIN (FUO): Status: RESOLVED | Noted: 2021-11-02 | Resolved: 2023-11-06

## 2023-11-06 PROBLEM — R25.3 MUSCLE TWITCH: Status: RESOLVED | Noted: 2018-10-30 | Resolved: 2023-11-06

## 2023-11-06 PROBLEM — F41.9 ANXIETY: Status: ACTIVE | Noted: 2021-12-08

## 2023-11-06 LAB
BILIRUB UR QL STRIP: NEGATIVE
CLARITY UR: ABNORMAL
COLOR UR: YELLOW
GLUCOSE UR QL STRIP: NEGATIVE
HGB UR QL STRIP: ABNORMAL
KETONES UR QL STRIP: NEGATIVE
LEUKOCYTE ESTERASE UR QL STRIP: NEGATIVE
NITRITE UR QL STRIP: NEGATIVE
PH UR STRIP: 6 [PH] (ref 5–8)
PROT UR QL STRIP: NEGATIVE
SP GR UR STRIP: 1.01 (ref 1–1.03)
URN SPEC COLLECT METH UR: ABNORMAL

## 2023-11-06 PROCEDURE — 99396 PREV VISIT EST AGE 40-64: CPT | Mod: 25,S$GLB,, | Performed by: FAMILY MEDICINE

## 2023-11-06 PROCEDURE — 1159F PR MEDICATION LIST DOCUMENTED IN MEDICAL RECORD: ICD-10-PCS | Mod: CPTII,S$GLB,, | Performed by: FAMILY MEDICINE

## 2023-11-06 PROCEDURE — 3079F DIAST BP 80-89 MM HG: CPT | Mod: CPTII,S$GLB,, | Performed by: FAMILY MEDICINE

## 2023-11-06 PROCEDURE — 99999 PR PBB SHADOW E&M-EST. PATIENT-LVL IV: ICD-10-PCS | Mod: PBBFAC,,, | Performed by: FAMILY MEDICINE

## 2023-11-06 PROCEDURE — 81003 URINALYSIS AUTO W/O SCOPE: CPT | Mod: PO | Performed by: FAMILY MEDICINE

## 2023-11-06 PROCEDURE — 90471 TDAP VACCINE GREATER THAN OR EQUAL TO 7YO IM: ICD-10-PCS | Mod: S$GLB,,, | Performed by: FAMILY MEDICINE

## 2023-11-06 PROCEDURE — 90471 IMMUNIZATION ADMIN: CPT | Mod: S$GLB,,, | Performed by: FAMILY MEDICINE

## 2023-11-06 PROCEDURE — 3008F PR BODY MASS INDEX (BMI) DOCUMENTED: ICD-10-PCS | Mod: CPTII,S$GLB,, | Performed by: FAMILY MEDICINE

## 2023-11-06 PROCEDURE — 3074F SYST BP LT 130 MM HG: CPT | Mod: CPTII,S$GLB,, | Performed by: FAMILY MEDICINE

## 2023-11-06 PROCEDURE — 3008F BODY MASS INDEX DOCD: CPT | Mod: CPTII,S$GLB,, | Performed by: FAMILY MEDICINE

## 2023-11-06 PROCEDURE — 99396 PR PREVENTIVE VISIT,EST,40-64: ICD-10-PCS | Mod: 25,S$GLB,, | Performed by: FAMILY MEDICINE

## 2023-11-06 PROCEDURE — 3074F PR MOST RECENT SYSTOLIC BLOOD PRESSURE < 130 MM HG: ICD-10-PCS | Mod: CPTII,S$GLB,, | Performed by: FAMILY MEDICINE

## 2023-11-06 PROCEDURE — 1159F MED LIST DOCD IN RCRD: CPT | Mod: CPTII,S$GLB,, | Performed by: FAMILY MEDICINE

## 2023-11-06 PROCEDURE — 1160F PR REVIEW ALL MEDS BY PRESCRIBER/CLIN PHARMACIST DOCUMENTED: ICD-10-PCS | Mod: CPTII,S$GLB,, | Performed by: FAMILY MEDICINE

## 2023-11-06 PROCEDURE — 1160F RVW MEDS BY RX/DR IN RCRD: CPT | Mod: CPTII,S$GLB,, | Performed by: FAMILY MEDICINE

## 2023-11-06 PROCEDURE — 3079F PR MOST RECENT DIASTOLIC BLOOD PRESSURE 80-89 MM HG: ICD-10-PCS | Mod: CPTII,S$GLB,, | Performed by: FAMILY MEDICINE

## 2023-11-06 PROCEDURE — 99999 PR PBB SHADOW E&M-EST. PATIENT-LVL IV: CPT | Mod: PBBFAC,,, | Performed by: FAMILY MEDICINE

## 2023-11-06 PROCEDURE — 90715 TDAP VACCINE GREATER THAN OR EQUAL TO 7YO IM: ICD-10-PCS | Mod: S$GLB,,, | Performed by: FAMILY MEDICINE

## 2023-11-06 PROCEDURE — 90715 TDAP VACCINE 7 YRS/> IM: CPT | Mod: S$GLB,,, | Performed by: FAMILY MEDICINE

## 2023-11-07 ENCOUNTER — CLINICAL SUPPORT (OUTPATIENT)
Dept: REHABILITATION | Facility: HOSPITAL | Age: 43
End: 2023-11-07
Payer: COMMERCIAL

## 2023-11-07 DIAGNOSIS — M62.81 MUSCLE WEAKNESS: ICD-10-CM

## 2023-11-07 DIAGNOSIS — M25.572 ACUTE LEFT ANKLE PAIN: Primary | ICD-10-CM

## 2023-11-07 DIAGNOSIS — R26.89 IMPAIRMENT OF BALANCE: ICD-10-CM

## 2023-11-07 DIAGNOSIS — R26.89 DECREASED FUNCTIONAL MOBILITY: ICD-10-CM

## 2023-11-07 PROCEDURE — 97110 THERAPEUTIC EXERCISES: CPT | Mod: PN,CQ

## 2023-11-07 PROCEDURE — 97140 MANUAL THERAPY 1/> REGIONS: CPT | Mod: PN,CQ

## 2023-11-07 NOTE — PROGRESS NOTES
"[facility]   Physical Therapy Treatment Note      Name: Oneida Villalba  Clinic Number: [id number]    Therapy Diagnosis:   Encounter Diagnoses   Name Primary?    Acute left ankle pain Yes    Muscle weakness     Impairment of balance     Decreased functional mobility          Physician: Kane Nunez MD    Visit Date: 11/7/2023    Physician Orders: PT Eval and Treat   Medical Diagnosis from Referral: Sprain of deltoid ligament of left ankle, initial encounter  Evaluation Date: 10/5/2023  Authorization Period Expiration: 11/1/23  Plan of Care Expiration: 12/1/23  Visit # / Visits authorized: 4/21    Time In: 10:50 am  Time Out: 11:35 pm  Total Billable Time: 45 minutes    Precautions: Standard    PTA Visit #: 3/5       Subjective     Patient reports: went to a wedding over the weekend and wore a 3" wide heel and was able to dance all night with no residual soreness or pain in the ankle that night or next day. Has been compliant with HEP. Saw her primary care MD who sent a referral to start her hip after finishing for her foot.   She was compliant with home exercise program.  Response to previous treatment: felt good  Functional change: increased ease up/down stairs, down still challenging    Pain: 1-2/10  Location: left ankle     FOTO completion:    Objective      Standing: R iliac crest elevated, R PSIS elevated    Treatment     Oneida received the treatments listed below:      therapeutic exercises to develop strength, ROM, flexibility, and core stabilization for 30 minutes including:  (NP, HEP only) L ankle strengthening (DF, Inv, marisol) x20 ea GTB  Piriformis stretch 3x20 sec  Bridges x20, 5 sec hold  Supine sural nerve glide (knee extends with foot inverted and dorsiflexed) x 10 on L  SL hip abd 2x10 ea  Push/pull 3x3 sec  Seated posterior tib stretch  Standing heel raises 2x10  Standing Gastroc stretch 3x20 sec  Hip abd walk Y sport loop 3 laps in // bars    Oneida participated in " "neuromuscular re-education activities to improve: Balance, Kinesthetic, Sense, and Proprioception for 15 minutes. The following activities were included:  Seated towel crunches 20 x 3s hold  Seated arch lifts seated x10, 5 sec hold,  standing arch lifts 10 x 5 sec hold, staggered arch lifts 10 x 5s each  (NP) Seated towel pulls into eversion, 1# with heel on 2" step x 3 trials  weight shifts (A/P, lateral) x10 ea  SLS on foam 2 x 30 sec  balance on fitter board with rocking x 10 taps double leg and single leg  Fitter board balance B LE x 30 sec    May add: RDLs to box, squat touch to 18" box,     manual therapy techniques: Soft tissue Mobilization were applied to the: L ankle for 00 minutes, including:  STM to L posterior tib  L ankle distraction into manual dorsiflexion and posterior tib stretch      Patient Education and Home Exercises       Education provided:   - SIJ anatomy  Pt gave verbal understanding to all education provided     Written Home Exercises Provided: yes. Exercises were reviewed and Oneida was able to demonstrate them prior to the end of the session.  Oneida demonstrated good  understanding of the education provided. See Electronic Medical Record under Patient Instructions for exercises provided during therapy sessions    Assessment     Pt tolerated progression of treatment very well with no pain to the left ankle. Some challenge continues with foot intrinsic activation, especially with unilateral activation in staggered stance. Improving balance strategies with fitter board. May benefit from further progression for hip and LE strengthening for greater LE kinetic chain strength and endurance. She will continue to benefit from PT for improved LE strength, balance and ankle stability.    Oneida Is progressing well towards her goals.   Patient prognosis is Good.     Patient will continue to benefit from skilled outpatient physical therapy to address the deficits listed in the problem list box on " initial evaluation, provide pt/family education and to maximize pt's level of independence in the home and community environment.     Patient's spiritual, cultural and educational needs considered and pt agreeable to plan of care and goals.     Anticipated barriers to physical therapy: none    Goals:   Short Term Goals:  4 weeks (progressing, not met)  1.Report decreased    L ankle    pain  <   / =  3  /10 at its worst  to increase tolerance for ADLs  2. Pt will perform L SLS with good balance strategies for increased ease ambulating on uneven surfaces.  3. Increased LE strength by 1/3 muscle grade  to increase tolerance for ADL and work activities.  4. Pt will report ambulating x30 min on treadmill without pain.  5. Pt to tolerate HEP to improve ROM and independence with ADL's    Long Term Goals: 8 weeks (progressing, not met)  1.Report decreased    L ankle    pain  <   / =  1  /10  to increase tolerance for ADLs  2.pt will perform squats with 20# KB (2x10 reps) without pain for increased tolerance for exercising at gym.  3.Increased LE strength by 1 muscle grade  to increase tolerance for ADL and work activities.  4.Pt to report ability to walk x1hr on treadmill without pain.  5. Pt to be Independent with HEP to improve ROM and independence with ADL's      Plan     Continue PT towards established goals.  Progress LE strengthening and balance/proprioceptive training as tolerated.    Plan of care Certification: 10/5/2023 to 12/1/23.    Outpatient Physical Therapy 1 times weekly for 8 weeks to include the following interventions: Electrical Stimulation  , Gait Training, Manual Therapy, Moist Heat/ Ice, Neuromuscular Re-ed, Patient Education, Self Care, Therapeutic Activities, Therapeutic Exercise, Ultrasound, and dry needling.     Serena Prado, PTA

## 2023-11-13 ENCOUNTER — CLINICAL SUPPORT (OUTPATIENT)
Dept: REHABILITATION | Facility: HOSPITAL | Age: 43
End: 2023-11-13
Payer: COMMERCIAL

## 2023-11-13 DIAGNOSIS — M62.81 MUSCLE WEAKNESS: ICD-10-CM

## 2023-11-13 DIAGNOSIS — R26.89 DECREASED FUNCTIONAL MOBILITY: ICD-10-CM

## 2023-11-13 DIAGNOSIS — R26.89 IMPAIRMENT OF BALANCE: ICD-10-CM

## 2023-11-13 DIAGNOSIS — M25.572 ACUTE LEFT ANKLE PAIN: Primary | ICD-10-CM

## 2023-11-13 PROCEDURE — 97110 THERAPEUTIC EXERCISES: CPT | Mod: PN

## 2023-11-13 PROCEDURE — 97112 NEUROMUSCULAR REEDUCATION: CPT | Mod: PN

## 2023-11-13 PROCEDURE — 97140 MANUAL THERAPY 1/> REGIONS: CPT | Mod: PN

## 2023-11-13 NOTE — PLAN OF CARE
[facility]   Updated Plan of Care and Physical Therapy Treatment Note      Name: Oneida Villalba  Clinic Number: [id number]    Therapy Diagnosis:   Encounter Diagnoses   Name Primary?    Acute left ankle pain Yes    Muscle weakness     Impairment of balance     Decreased functional mobility          Physician: Kane Nunez MD    Visit Date: 11/13/2023    Physician Orders: PT Eval and Treat   Medical Diagnosis from Referral: Sprain of deltoid ligament of left ankle, initial encounter  Evaluation Date: 10/5/2023  Authorization Period Expiration: 11/1/23  Plan of Care Expiration: 12/15/23  Visit # / Visits authorized: 5/21    Time In: 9:25  Time Out:10:10  Total Billable Time: 45 minutes    Precautions: Standard    PTA Visit #: 2/5       Subjective     Patient reports: she is doing better overall.  She still has some pain mainly coming down the stairs.  She was compliant with home exercise program.  Response to previous treatment: felt good  Functional change: increased ease up/down stairs, down still challenging    Pain: 2/10  Location: left ankle     Objective      Ankle Range of Motion: AROM   Ankle Left Right   Dorsiflexion 5 (6 knee flex) 1 (2 knee flex)   Plantarflexion 67  70    Inversion 26  30    Eversion 12 9        Lower Extremity Strength  Left LE  Right LE    Knee extension: 5/5 Knee extension: 5/5   Knee flexion: 5/5 Knee flexion: 5/5   Hip flexion: 5/5 Hip flexion: 5/5   Hip extension:  4+/5 Hip extension: 4+/5   Hip abduction: 5/5 Hip abduction: 5/5   Hip adduction: 5/5 Hip adduction 5/5   Ankle dorsiflexion: 5/5 Ankle dorsiflexion: 5/5   Ankle plantarflexion: 5/5 Ankle plantarflexion: 5/5   Ankle inversion 4+/5 Ankle inversion 5/5   Ankle eversion 5/5 Ankle eversion 5/5       Special Tests:  AC Joint Left Right   Anterior Drawer Test - -       SLS: R: 30 sec, L: 30 sec     Joint Mobility: WFL B for talocrural and subtalar mobility    Palpation: TTP to L posterior tib  "tendon    FOTO: 72    Treatment     Oneida received the treatments listed below:      therapeutic exercises to develop strength, ROM, flexibility, and core stabilization for 27 minutes including:  Reassessment  L ankle strengthening (DF, Inv, marisol) x20 ea BTB  Piriformis stretch 3x20 sec  Bridges x20, 5 sec hold  Supine sural nerve glide (knee extends with foot inverted and dorsiflexed) x 10 on L  NP SL hip abd 2x10 ea  Push/pull 3x3 sec  NP Seated posterior tib stretch  Standing heel raises 2x10  NP Standing Gastroc stretch 3x20 sec  NP Hip abd walk Y sport loop 3 laps in // bars    Oneida participated in neuromuscular re-education activities to improve: Balance, Kinesthetic, Sense, and Proprioception for 10 minutes. The following activities were included:  Seated towel crunches 20 x 3s hold  Seated arch lifts x10, 5 sec hold, progressed to standing arch lifts 10 x 5 sec hold  SLS x30 sec, on foam x 30 sec  balance on fitter board x30 sec fwd/bkwd double leg and single leg (L)    Not performed due to time:  Seated towel pulls into eversion, 1# with heel on 2" step x 3 trials  weight shifts (A/P, lateral) x10 ea    May add: RDLs     manual therapy techniques: Soft tissue Mobilization were applied to the: L ankle for 08 minutes, including:  STM to L posterior tib  L ankle distraction into manual dorsiflexion and posterior tib stretch      Patient Education and Home Exercises       Education provided:   -improvements towards goals  -may begin walking on TM without incline  Pt gave verbal understanding to all education provided     Written Home Exercises Provided: pt given BTB for updated HEP. Exercises were reviewed and Oneida was able to demonstrate them prior to the end of the session.  Oneida demonstrated good  understanding of the education provided. See Electronic Medical Record under Patient Instructions for exercises provided during therapy sessions    Assessment     Pt reassessed today.  She has only had 6 " visits.  She has improved with increased LE strength.  Single leg weight shifts of fitter board were most challenging.  She is improving with arch doming in standing.  She will continue to benefit from PT for improved LE strength, balance and ankle stability.    Oneida Is progressing well towards her goals.   Patient prognosis is Good.     Patient will continue to benefit from skilled outpatient physical therapy to address the deficits listed in the problem list box on initial evaluation, provide pt/family education and to maximize pt's level of independence in the home and community environment.     Patient's spiritual, cultural and educational needs considered and pt agreeable to plan of care and goals.     Anticipated barriers to physical therapy: none    Goals:   Short Term Goals:  4 weeks   1.Report decreased    L ankle    pain  <   / =  3  /10 at its worst  to increase tolerance for ADLs (met)  2. Pt will perform L SLS with good balance strategies for increased ease ambulating on uneven surfaces.(Met)  3. Increased LE strength by 1/3 muscle grade  to increase tolerance for ADL and work activities. (Met)  4. Pt will report ambulating x30 min on treadmill without pain. (Met)  5. Pt to tolerate HEP to improve ROM and independence with ADL's (met)    Long Term Goals: 8 weeks (progressing, not met)  1.Report decreased    L ankle    pain  <   / =  1  /10  to increase tolerance for ADLs  2.pt will perform squats with 20# KB (2x10 reps) without pain for increased tolerance for exercising at gym.  3.Increased LE strength by 1 muscle grade  to increase tolerance for ADL and work activities.  4.Pt to report ability to walk x1hr on treadmill without pain.  5. Pt to be Independent with HEP to improve ROM and independence with ADL's      Plan     Continue PT towards established goals.  Progress LE strengthening and balance/proprioceptive training as tolerated.    Plan of care Certification: 11/13/2023 to  12/15/23.    Outpatient Physical Therapy 1 times weekly for 4 more weeks to include the following interventions: Electrical Stimulation  , Gait Training, Manual Therapy, Moist Heat/ Ice, Neuromuscular Re-ed, Patient Education, Self Care, Therapeutic Activities, Therapeutic Exercise, Ultrasound, and dry needling.     Maida Kendrick, PT

## 2023-11-27 ENCOUNTER — CLINICAL SUPPORT (OUTPATIENT)
Dept: REHABILITATION | Facility: HOSPITAL | Age: 43
End: 2023-11-27
Payer: COMMERCIAL

## 2023-11-27 ENCOUNTER — TELEPHONE (OUTPATIENT)
Dept: GASTROENTEROLOGY | Facility: CLINIC | Age: 43
End: 2023-11-27
Payer: COMMERCIAL

## 2023-11-27 DIAGNOSIS — R26.89 DECREASED FUNCTIONAL MOBILITY: ICD-10-CM

## 2023-11-27 DIAGNOSIS — M62.81 MUSCLE WEAKNESS: ICD-10-CM

## 2023-11-27 DIAGNOSIS — R26.89 IMPAIRMENT OF BALANCE: ICD-10-CM

## 2023-11-27 DIAGNOSIS — M25.572 ACUTE LEFT ANKLE PAIN: Primary | ICD-10-CM

## 2023-11-27 PROCEDURE — 97110 THERAPEUTIC EXERCISES: CPT | Mod: PN

## 2023-11-27 PROCEDURE — 97112 NEUROMUSCULAR REEDUCATION: CPT | Mod: PN

## 2023-11-27 PROCEDURE — 97140 MANUAL THERAPY 1/> REGIONS: CPT | Mod: PN

## 2023-11-27 NOTE — TELEPHONE ENCOUNTER
Colonoscopy is scheduled on 4/17 with Dr. De Santiago at [address] [facility] [address] in [place].     Prep instructions has been sent via [facility] and via mail. Address is confirmed. Patient is informed the preop Nurse will call him/her with the arrival time a day or two prior to procedure. Patient verbalizes understanding.

## 2023-11-27 NOTE — PROGRESS NOTES
[facility]   Physical Therapy Treatment Note       Name: Oneida Villalba  Clinic Number: [id number]     Therapy Diagnosis:        Encounter Diagnoses   Name Primary?    Acute left ankle pain Yes    Muscle weakness      Impairment of balance      Decreased functional mobility              Physician: Kane Nunez MD     Visit Date: 11/13/2023     Physician Orders: PT Eval and Treat   Medical Diagnosis from Referral: Sprain of deltoid ligament of left ankle, initial encounter  Evaluation Date: 10/5/2023  Authorization Period Expiration: 11/1/23  Plan of Care Expiration: 12/15/23  Visit # / Visits authorized: 6/21     Time In: 10:05  Time Out:10:50  Total Billable Time: 45 minutes     Precautions: Standard     PTA Visit #: 2/5         Subjective      Patient reports: she was walking on the beach in [place] so her ankle was a little sore.  She was compliant with home exercise program.  Response to previous treatment: felt good  Functional change: increased ease up/down stairs, down still challenging     Pain: 3/10  Location: left ankle      Objective          Treatment      Oneida received the treatments listed below:       therapeutic exercises to develop strength, ROM, flexibility, and core stabilization for 27 minutes including:  L ankle strengthening (DF, Inv, marisol) x20 ea BTB  Piriformis stretch 3x20 sec  Bridges x20, 5 sec hold  Bridge + march 2x5  NP Supine sural nerve glide (knee extends with foot inverted and dorsiflexed) x 10 on L  SL hip abd 2x15 ea  Push/pull 3x3 sec  Seated posterior tib stretch  NP Standing heel raises 2x10  NP Standing Gastroc stretch 3x20 sec  NP Hip abd walk Y sport loop 3 laps in // bars     Oneida participated in neuromuscular re-education activities to improve: Balance, Kinesthetic, Sense, and Proprioception for 10 minutes. The following activities were included:  Seated towel crunches 20 x 3s hold  Seated arch lifts x10, 5 sec hold, progressed to standing arch  "lifts 10 x 5 sec hold  SLS x30 sec, on foam x 30 sec  balance on fitter board x30 sec fwd/bkwd double leg and single leg (L)  Seated towel pulls into eversion, 1# with heel on 2" step x 3 trials  weight shifts (A/P, lateral) x10 ea     May add: RDLs      manual therapy techniques: Soft tissue Mobilization were applied to the: L ankle for 08 minutes, including:  STM to L posterior tib  L ankle distraction into manual dorsiflexion and posterior tib stretch        Patient Education and Home Exercises        Education provided:   -improvements towards goals  -may begin walking on TM without incline  Pt gave verbal understanding to all education provided      Written Home Exercises Provided: pt given BTB for updated HEP. Exercises were reviewed and Oneida was able to demonstrate them prior to the end of the session.  Oneida demonstrated good  understanding of the education provided. See Electronic Medical Record under Patient Instructions for exercises provided during therapy sessions     Assessment      Pt with some increased soreness today after walking on beach last week.  She was able to tolerate progression of glute strengthening.  SLS on foam was challenging.  Improved control on fitter board today.  She will continue to benefit from PT for improved LE strength, balance and ankle stability.     Oneida Is progressing well towards her goals.   Patient prognosis is Good.      Patient will continue to benefit from skilled outpatient physical therapy to address the deficits listed in the problem list box on initial evaluation, provide pt/family education and to maximize pt's level of independence in the home and community environment.      Patient's spiritual, cultural and educational needs considered and pt agreeable to plan of care and goals.     Anticipated barriers to physical therapy: none     Goals:   Short Term Goals:  4 weeks   1.Report decreased    L ankle    pain  <   / =  3  /10 at its worst  to increase " tolerance for ADLs (met)  2. Pt will perform L SLS with good balance strategies for increased ease ambulating on uneven surfaces.(Met)  3. Increased LE strength by 1/3 muscle grade  to increase tolerance for ADL and work activities. (Met)  4. Pt will report ambulating x30 min on treadmill without pain. (Met)  5. Pt to tolerate HEP to improve ROM and independence with ADL's (met)     Long Term Goals: 8 weeks (progressing, not met)  1.Report decreased    L ankle    pain  <   / =  1  /10  to increase tolerance for ADLs  2.pt will perform squats with 20# KB (2x10 reps) without pain for increased tolerance for exercising at gym.  3.Increased LE strength by 1 muscle grade  to increase tolerance for ADL and work activities.  4.Pt to report ability to walk x1hr on treadmill without pain.  5. Pt to be Independent with HEP to improve ROM and independence with ADL's        Plan      Continue PT towards established goals.  Progress LE strengthening and balance/proprioceptive training as tolerated.     Plan of care Certification: 11/13/2023 to 12/15/23.     Outpatient Physical Therapy 1 times weekly for 4 more weeks to include the following interventions: Electrical Stimulation  , Gait Training, Manual Therapy, Moist Heat/ Ice, Neuromuscular Re-ed, Patient Education, Self Care, Therapeutic Activities, Therapeutic Exercise, Ultrasound, and dry needling.      Maida Kendrick, PT

## 2023-12-01 NOTE — PROGRESS NOTES
[facility]   Physical Therapy Treatment Note       Name: Oneida Villalba  Clinic Number: [id number]     Therapy Diagnosis:        Encounter Diagnoses   Name Primary?    Acute left ankle pain Yes    Muscle weakness      Impairment of balance      Decreased functional mobility            Physician: Kane Nunez MD     Visit Date: 11/13/2023     Physician Orders: PT Eval and Treat   Medical Diagnosis from Referral: Sprain of deltoid ligament of left ankle, initial encounter  Evaluation Date: 10/5/2023  Authorization Period Expiration: 11/1/23  Plan of Care Expiration: 12/15/23  Visit # / Visits authorized: 7 / 21     Time In: 9:24  Time Out: 10:08  Total Billable Time: 45 minutes     Precautions: Standard     PTA Visit #: 1/5         Subjective      Patient reports: overall doing better but still getting that pain and soreness along the inside of the ankle and lower leg..  She was compliant with home exercise program.  Response to previous treatment: felt good  Functional change: still some gastroc tightness     Pain: 2/10  Location: left ankle      Objective          Treatment      Oneida received the treatments listed below:       therapeutic exercises to develop strength, ROM, flexibility, and core stabilization for 20 minutes including:  L ankle strengthening (DF, Inv, marisol) x20 ea BTB  (HEP only) Piriformis stretch 3x20 sec  (HEP only)Bridges x20, 5 sec hold  Bridge + march 10 x 3 marches each  NP Supine sural nerve glide (knee extends with foot inverted and dorsiflexed) x 10 on L  (HEP only) SL hip abd 2x15 ea  Push/pull 3x3 sec   Seated posterior tib stretch  NP Standing heel raises 2x10  NP Standing Gastroc stretch 3x20 sec  NP Hip abd walk Y sport loop 3 laps in // bars     Oneida participated in neuromuscular re-education activities to improve: Balance, Kinesthetic, Sense, and Proprioception for 10 minutes. The following activities were included:  Seated towel crunches 20 x 3s  "hold  Seated arch lifts x10, 5 sec hold, progressed to standing arch lifts 10 x 5 sec hold  Seated towel pulls into eversion, 2# with heel on 2" step x 5 trials  SLS x30 sec, on foam x 30 sec  balance on fitter board x30 sec fwd/bkwd double leg and single leg (L)  (NP) weight shifts (A/P, lateral) x10 ea     May add: RDLs      manual therapy techniques: Soft tissue Mobilization were applied to the: L ankle for 15 minutes, including:  STM to L posterior tib  L ankle distraction into manual dorsiflexion and posterior tib stretch        Patient Education and Home Exercises        Education provided:   -improvements towards goals  -may begin walking on TM without incline  Pt gave verbal understanding to all education provided      Written Home Exercises Provided: pt given BTB for updated HEP. Exercises were reviewed and Oneida was able to demonstrate them prior to the end of the session.  Oneida demonstrated good  understanding of the education provided. See Electronic Medical Record under Patient Instructions for exercises provided during therapy sessions     Assessment      Pt with improving symptoms overall, making gains in strength and stability. Does continue with some soreness and tenderness along tibialis posterior muscle belly and tendon. Glute and hip strength impairments remain factor for standing closed chain stabilization. Remains mildly challenged with foot intrinsic motor control with doming.  She will continue to benefit from PT for improved LE strength, balance and ankle stability.     Oneida Is progressing well towards her goals.   Patient prognosis is Good.      Patient will continue to benefit from skilled outpatient physical therapy to address the deficits listed in the problem list box on initial evaluation, provide pt/family education and to maximize pt's level of independence in the home and community environment.      Patient's spiritual, cultural and educational needs considered and pt agreeable " to plan of care and goals.     Anticipated barriers to physical therapy: none     Goals:   Short Term Goals:  4 weeks   1.Report decreased    L ankle    pain  <   / =  3  /10 at its worst  to increase tolerance for ADLs (met)  2. Pt will perform L SLS with good balance strategies for increased ease ambulating on uneven surfaces.(Met)  3. Increased LE strength by 1/3 muscle grade  to increase tolerance for ADL and work activities. (Met)  4. Pt will report ambulating x30 min on treadmill without pain. (Met)  5. Pt to tolerate HEP to improve ROM and independence with ADL's (met)     Long Term Goals: 8 weeks (progressing, not met)  1.Report decreased    L ankle    pain  <   / =  1  /10  to increase tolerance for ADLs  2.pt will perform squats with 20# KB (2x10 reps) without pain for increased tolerance for exercising at gym.  3.Increased LE strength by 1 muscle grade  to increase tolerance for ADL and work activities.  4.Pt to report ability to walk x1hr on treadmill without pain.  5. Pt to be Independent with HEP to improve ROM and independence with ADL's        Plan      Continue PT towards established goals.  Progress LE strengthening and balance/proprioceptive training as tolerated.     Plan of care Certification: 11/13/2023 to 12/15/23.     Outpatient Physical Therapy 1 times weekly for 4 more weeks to include the following interventions: Electrical Stimulation  , Gait Training, Manual Therapy, Moist Heat/ Ice, Neuromuscular Re-ed, Patient Education, Self Care, Therapeutic Activities, Therapeutic Exercise, Ultrasound, and dry needling.      Maida Kendrick, PT

## 2023-12-04 ENCOUNTER — CLINICAL SUPPORT (OUTPATIENT)
Dept: REHABILITATION | Facility: HOSPITAL | Age: 43
End: 2023-12-04
Payer: COMMERCIAL

## 2023-12-04 DIAGNOSIS — R26.89 DECREASED FUNCTIONAL MOBILITY: ICD-10-CM

## 2023-12-04 DIAGNOSIS — R26.89 IMPAIRMENT OF BALANCE: ICD-10-CM

## 2023-12-04 DIAGNOSIS — M62.81 MUSCLE WEAKNESS: ICD-10-CM

## 2023-12-04 DIAGNOSIS — M25.572 ACUTE LEFT ANKLE PAIN: Primary | ICD-10-CM

## 2023-12-04 PROCEDURE — 97110 THERAPEUTIC EXERCISES: CPT | Mod: PN,CQ

## 2023-12-04 PROCEDURE — 97140 MANUAL THERAPY 1/> REGIONS: CPT | Mod: PN,CQ

## 2023-12-04 PROCEDURE — 97112 NEUROMUSCULAR REEDUCATION: CPT | Mod: PN,CQ

## 2023-12-11 ENCOUNTER — CLINICAL SUPPORT (OUTPATIENT)
Dept: REHABILITATION | Facility: HOSPITAL | Age: 43
End: 2023-12-11
Payer: COMMERCIAL

## 2023-12-11 DIAGNOSIS — R26.89 IMPAIRMENT OF BALANCE: ICD-10-CM

## 2023-12-11 DIAGNOSIS — M25.572 ACUTE LEFT ANKLE PAIN: Primary | ICD-10-CM

## 2023-12-11 DIAGNOSIS — R26.89 DECREASED FUNCTIONAL MOBILITY: ICD-10-CM

## 2023-12-11 DIAGNOSIS — M62.81 MUSCLE WEAKNESS: ICD-10-CM

## 2023-12-11 PROCEDURE — 97112 NEUROMUSCULAR REEDUCATION: CPT | Mod: PN

## 2023-12-11 PROCEDURE — 97140 MANUAL THERAPY 1/> REGIONS: CPT | Mod: PN

## 2023-12-11 PROCEDURE — 97110 THERAPEUTIC EXERCISES: CPT | Mod: PN

## 2023-12-11 NOTE — PROGRESS NOTES
[facility] OUTPATIENT THERAPY AND WELLNESS   Physical Therapy Treatment Note       Name: Oneida Villalba  Clinic Number: [id number]     Therapy Diagnosis:        Encounter Diagnoses   Name Primary?    Acute left ankle pain Yes    Muscle weakness      Impairment of balance      Decreased functional mobility            Physician: Kane Nunez MD     Visit Date: 11/13/2023     Physician Orders: PT Eval and Treat   Medical Diagnosis from Referral: Sprain of deltoid ligament of left ankle, initial encounter  Evaluation Date: 10/5/2023  Authorization Period Expiration: 11/1/23  Plan of Care Expiration: 12/15/23  Visit # / Visits authorized: 8 / 21     Time In: 10:45  Time Out: 11:30  Total Billable Time: 45 minutes     Precautions: Standard     PTA Visit #: 1/5         Subjective      Patient reports: she does not have much pain this morning to L ankle.  She was compliant with home exercise program.  Response to previous treatment: felt good  Functional change: still some gastroc tightness     Pain: 1/10  Location: left ankle      Objective          Treatment      Oneida received the treatments listed below:       therapeutic exercises to develop strength, ROM, flexibility, and core stabilization for 20 minutes including:  L ankle strengthening (DF, Inv, marisol) x20 ea BTB  (HEP only) Piriformis stretch 3x20 sec  (HEP only)Bridges x20, 5 sec hold  Bridge + march 10 x 3 marches each  NP Supine sural nerve glide (knee extends with foot inverted and dorsiflexed) x 10 on L  (HEP only) SL hip abd 2x15 ea  Push/pull 3x3 sec   Seated posterior tib stretch  NP Standing heel raises 2x10  Standing Gastroc stretch 3x20 sec  Hip abd walk Y sport loop 2 laps ~20 ft     Oneida participated in neuromuscular re-education activities to improve: Balance, Kinesthetic, Sense, and Proprioception for 15 minutes. The following activities were included:  Seated towel crunches 20 x 5s hold  Seated arch lifts x10, 5 sec hold, progressed to standing arch  "lifts 10 x 5 sec hold  Seated towel pulls into eversion, 2# with heel on 2" step x 5 trials  SLS 2x30 sec on foam   balance on fitter board x30 sec fwd/bkwd double leg and single leg (L)  RDLs to 2nd stair x10  SLS with rotation x10     manual therapy techniques: Soft tissue Mobilization were applied to the: L ankle for 10 minutes, including:  STM to L posterior tib  L ankle distraction into manual dorsiflexion and posterior tib stretch        Patient Education and Home Exercises        Education provided:   -improvements towards goals  -may begin walking on TM without incline  Pt gave verbal understanding to all education provided      Written Home Exercises Provided: pt given BTB for updated HEP. Exercises were reviewed and Oneida was able to demonstrate them prior to the end of the session.  Oneida demonstrated good  understanding of the education provided. See Electronic Medical Record under Patient Instructions for exercises provided during therapy sessions     Assessment      Pt improving with arch doming on the L foot today.  SL rocker board and SLS with rotation were the most challenging.  She will continue to benefit from PT for improved LE strength, balance and ankle stability.     Oneida Is progressing well towards her goals.   Patient prognosis is Good.      Patient will continue to benefit from skilled outpatient physical therapy to address the deficits listed in the problem list box on initial evaluation, provide pt/family education and to maximize pt's level of independence in the home and community environment.      Patient's spiritual, cultural and educational needs considered and pt agreeable to plan of care and goals.     Anticipated barriers to physical therapy: none     Goals:   Short Term Goals:  4 weeks   1.Report decreased    L ankle    pain  <   / =  3  /10 at its worst  to increase tolerance for ADLs (met)  2. Pt will perform L SLS with good balance strategies for increased ease ambulating " on uneven surfaces.(Met)  3. Increased LE strength by 1/3 muscle grade  to increase tolerance for ADL and work activities. (Met)  4. Pt will report ambulating x30 min on treadmill without pain. (Met)  5. Pt to tolerate HEP to improve ROM and independence with ADL's (met)     Long Term Goals: 8 weeks (progressing, not met)  1.Report decreased    L ankle    pain  <   / =  1  /10  to increase tolerance for ADLs  2.pt will perform squats with 20# KB (2x10 reps) without pain for increased tolerance for exercising at gym.  3.Increased LE strength by 1 muscle grade  to increase tolerance for ADL and work activities.  4.Pt to report ability to walk x1hr on treadmill without pain.  5. Pt to be Independent with HEP to improve ROM and independence with ADL's        Plan      Continue PT towards established goals.  Progress LE strengthening and balance/proprioceptive training as tolerated.     Plan of care Certification: 11/13/2023 to 12/15/23.     Outpatient Physical Therapy 1 times weekly for 4 more weeks to include the following interventions: Electrical Stimulation  , Gait Training, Manual Therapy, Moist Heat/ Ice, Neuromuscular Re-ed, Patient Education, Self Care, Therapeutic Activities, Therapeutic Exercise, Ultrasound, and dry needling.      Maida Kendrick, PT

## 2024-01-08 ENCOUNTER — CLINICAL SUPPORT (OUTPATIENT)
Dept: REHABILITATION | Facility: HOSPITAL | Age: 44
End: 2024-01-08
Payer: COMMERCIAL

## 2024-01-08 DIAGNOSIS — M25.572 ACUTE LEFT ANKLE PAIN: Primary | ICD-10-CM

## 2024-01-08 DIAGNOSIS — R26.89 IMPAIRMENT OF BALANCE: ICD-10-CM

## 2024-01-08 DIAGNOSIS — M62.81 MUSCLE WEAKNESS: ICD-10-CM

## 2024-01-08 DIAGNOSIS — R26.89 DECREASED FUNCTIONAL MOBILITY: ICD-10-CM

## 2024-01-08 PROCEDURE — 97110 THERAPEUTIC EXERCISES: CPT | Mod: PN

## 2024-01-08 PROCEDURE — 97112 NEUROMUSCULAR REEDUCATION: CPT | Mod: PN

## 2024-01-08 PROCEDURE — 97140 MANUAL THERAPY 1/> REGIONS: CPT | Mod: PN

## 2024-01-08 NOTE — PLAN OF CARE
[facility]   Physical Therapy Treatment Note       Name: Oneida Villalba  Clinic Number: [id number]     Therapy Diagnosis:        Encounter Diagnoses   Name Primary?    Acute left ankle pain Yes    Muscle weakness      Impairment of balance      Decreased functional mobility            Physician: Kane Nunez MD     Visit Date: 11/13/2023     Physician Orders: PT Eval and Treat   Medical Diagnosis from Referral: Sprain of deltoid ligament of left ankle, initial encounter  Evaluation Date: 10/5/2023  Authorization Period Expiration: 11/1/23  Plan of Care Expiration: 12/15/23  Visit # / Visits authorized: 9 / 21     Time In: 10:45  Time Out: 11:30  Total Billable Time: 43 minutes     Precautions: Standard     PTA Visit #: 1/5         Subjective      Patient reports: she has not had much pain over the holidays.  She was able to wear high heels.  She is ready to manage on her own and start PT for her hip.  She was compliant with home exercise program.  Response to previous treatment: felt good  Functional change: still some gastroc tightness     Pain: 0/10  Location: left ankle      Objective       Ankle Range of Motion: AROM   Ankle Left Right   Dorsiflexion 5 (6 knee flex) 5 (2 knee flex)   Plantarflexion 67  70    Inversion 26  30    Eversion 12 12          Lower Extremity Strength  Left LE   Right LE     Knee extension: 5/5 Knee extension: 5/5   Knee flexion: 5/5 Knee flexion: 5/5   Hip flexion: 5/5 Hip flexion: 5/5   Hip extension:  5/5 Hip extension: 5/5   Hip abduction: 5/5 Hip abduction: 5/5   Hip adduction: 5/5 Hip adduction 5/5   Ankle dorsiflexion: 5/5 Ankle dorsiflexion: 5/5   Ankle plantarflexion: 5/5 Ankle plantarflexion: 5/5   Ankle inversion 5/5 Ankle inversion 5/5   Ankle eversion 5/5 Ankle eversion 5/5         Special Tests:  AC Joint Left Right   Anterior Drawer Test - -         SLS: R: 30 sec, L: 30 sec      Joint Mobility: WFL B for talocrural and subtalar mobility      FOTO: 75%     Treatment      Oneida received the treatments listed below:       therapeutic exercises to develop strength, ROM, flexibility, and core stabilization for 25 minutes including:  Reassessment  L ankle strengthening (DF, Inv, marisol) x20 ea BTB  Bridge + march 10 x 3 marches each  Seated posterior tib stretch  Standing Gastroc stretch 3x20 sec  Hip abd walk Y sport loop 2 laps ~20 ft     Oneida participated in neuromuscular re-education activities to improve: Balance, Kinesthetic, Sense, and Proprioception for 10 minutes. The following activities were included:  RDLs to cone x10ea  SLS with rotation x10     manual therapy techniques: Soft tissue Mobilization were applied to the: L ankle for 8 minutes, including:  STM to L posterior tib  L ankle distraction into manual dorsiflexion and posterior tib stretch        Patient Education and Home Exercises        Education provided:   -continue PT upon discharge and may return back to gym  Pt gave verbal understanding to all education provided      Written Home Exercises Provided: pt given BTB for updated HEP. Exercises were reviewed and Oneida was able to demonstrate them prior to the end of the session.  Oneida demonstrated good  understanding of the education provided. See Electronic Medical Record under Patient Instructions for exercises provided during therapy sessions     Assessment      Pt reassessed today.  She has met all goals and is safe for discharge to continue     Patient's spiritual, cultural and educational needs considered and pt agreeable to plan of care and goals.     Anticipated barriers to physical therapy: none     Goals:   Short Term Goals:  4 weeks   1.Report decreased    L ankle    pain  <   / =  3  /10 at its worst  to increase tolerance for ADLs (met)  2. Pt will perform L SLS with good balance strategies for increased ease ambulating on uneven surfaces.(Met)  3. Increased LE strength by 1/3 muscle grade  to increase tolerance for ADL  and work activities. (Met)  4. Pt will report ambulating x30 min on treadmill without pain. (Met)  5. Pt to tolerate HEP to improve ROM and independence with ADL's (met)     Long Term Goals: 8 weeks  1.Report decreased    L ankle    pain  <   / =  1  /10  to increase tolerance for ADLs (met)  2.pt will perform squats with 20# KB (2x10 reps) without pain for increased tolerance for exercising at gym. (Met)  3.Increased LE strength by 1 muscle grade  to increase tolerance for ADL and work activities. (Met)  4.Pt to report ability to walk x1hr on treadmill without pain. (Met)  5. Pt to be Independent with HEP to improve ROM and independence with ADL's (met)        Plan      Pt discharged from PT to continue to self manage with HEP.     Maida Kendrick, PT

## 2024-01-23 ENCOUNTER — CLINICAL SUPPORT (OUTPATIENT)
Dept: REHABILITATION | Facility: HOSPITAL | Age: 44
End: 2024-01-23
Payer: COMMERCIAL

## 2024-01-23 DIAGNOSIS — M25.552 LEFT HIP PAIN: ICD-10-CM

## 2024-01-23 DIAGNOSIS — M62.81 MUSCLE WEAKNESS: Primary | ICD-10-CM

## 2024-01-23 DIAGNOSIS — R26.89 DECREASED FUNCTIONAL MOBILITY: ICD-10-CM

## 2024-01-23 PROCEDURE — 97112 NEUROMUSCULAR REEDUCATION: CPT | Mod: PN

## 2024-01-23 PROCEDURE — 97161 PT EVAL LOW COMPLEX 20 MIN: CPT | Mod: PN

## 2024-01-24 NOTE — PLAN OF CARE
"[facility]  Physical Therapy Initial Evaluation    Name: Oneida Villalba  Clinic Number: [id number]    Therapy Diagnosis:   Encounter Diagnoses   Name Primary?    Left hip pain     Muscle weakness Yes    Decreased functional mobility      Physician: Kane Nunez MD    Physician Orders: PT Eval and Treat   Medical Diagnosis from Referral: Left hip pain   Evaluation Date: 1/23/2024  Authorization Period Expiration: 11/5/24  Plan of Care Expiration: 3/22/24  Visit # / Visits authorized: 1/ 1    Time In: 11:35  Time Out: 12:25  Total Billable Time: 50 minutes    Precautions: Standard    Subjective   Date of onset: 10 years; worse more recently  History of current condition - Oneida reports: pain to L SIJ.  She states it got worse after having her son.  Push/pull and bridges makes it feel better.  Sometimes her hips feel "stuck"       Medical History:   Past Medical History:   Diagnosis Date    Heart murmur        Surgical History:   Oneida Villalba  has no past surgical history on file.    Medications:   Oneida currently has no medications in their medication list.    Allergies:   Review of patient's allergies indicates:   Allergen Reactions    [place] Anaphylaxis, Blisters, Dermatitis, Hives, Itching, Rash and Swelling    Mangosteen peel Swelling        Imaging, none: for pelvis or hip    Prior Therapy: PT for foot  Social History: pt lives with their family; 2 story house  Occupation:   Prior Level of Function: I with ADLs  Current Level of Function: pain and difficulty with rolling in bed, lying on side, reaching into back seat    Pain:  Current 5/10, worst 6/10, best 2/10   Location: L SIJ, L low back and hips   Description: Dull  Aggravating Factors: rolling bed, side lying, supine to sit  Easing Factors: push/pull, bridges, glute exercises    Pts goals: decreased pain, increased strength, stabilization and mobility      Objective     Observation: pt is pleasant and " cooperative    Posture: fwd head, rounded shoulders    Lumbar ROM:  Flex:90  Ext: 90 pain at end ROM  R SB: 100  L SB:90, pain on L  R rot: 50, no pain  L rot: 50, pain on      Range of Motion: (deg)  hip Left active Right Active   Flexion 115 115   IR 45 45   ER 30 39       Lower Extremity Strength  Left LE  Right LE    Knee extension: 5/5 Knee extension: 5/5   Knee flexion: 5/5 Knee flexion: 5/5   Hip flexion: 4-/5 Hip flexion: 4-/5   Hip extension:  4+/5 Hip extension: 4/5   Hip abduction: 4/5 Hip abduction: 4+/5   Hip adduction: 5/5 Hip adduction 5/5   Ankle dorsiflexion: 5/5 Ankle dorsiflexion: 5/5       Special Tests:  FABERs: - B  Bettye's: - B    Bridge test: pt unable to perform bridge with LAQ while maintaining level pelvis    Function:  - Squat: able to squat, but presents with L knee valgus   - Sit <--> Stand:able to stand from chair without UE support   - Bed Mobility: I with all aspects    Joint Mobility: slight hypomobility to L hip jt     Palpation: TTP to L glute med    Sensation: grossly intact to light touch    Flexibility:    Popliteal Angle: R = -5 degrees ; L = -5 degrees   Bettye's test: R = - ; L = -       Intake Outcome Measure for FOTO hip Survey  Therapist reviewed FOTO scores for Oneida Villalba on 1/23/2024.  FOTO report- see Media section or FOTO account episode details.  Intake Score : 56%     PT Evaluation Completed? Yes  Discussed Plan of Care with patient: Yes      TREATMENT   Treatment Time In: 12:10  Treatment Time Out: 12:25  Total Treatment time separate from Evaluation: 15 minutes    Oneida received therapeutic exercises to develop strength for 5 minutes including:  SL hip abd x10 ea    May add prone hip ext with knee ext and knee flex    Oneida participated in neuromuscular re-education activities to improve: Kinesthetic, Sense, Proprioception, and core stabilization for 10 minutes. The following activities were included:  Bridges x10, 5 sec hold  Bridge + march x10 (1 march on ea  side ea rep)-vc for level pelvis  Modified side planks (on elbow and knees flexed) x10 sec ea (more difficult from L SL)  SL clams GTB x10 ea, 3 sec hold    May add: side plank + clam, quadruped hip ext, TA progressions, squats + hip abd iso    Home Exercises and Patient Education Provided    Education provided:   - role of PT  -importance of compliance with HEP  Pt gave verbal understanding to all education provided     Written Home Exercises Provided: yes.  Exercises were reviewed and Oneida was able to demonstrate them prior to the end of the session.  Oneida demonstrated good  understanding of the education provided.     See EMR under Patient Instructions for exercises provided 1/23/24.    Assessment   Oneida is a 43 y.o. female referred to outpatient Physical Therapy with a medical diagnosis of Left hip pain . Pt presents with pain to L SIJ, tightness to L glute medius, L knee valgus with squatting, decreased core and glute strength, pain with rolling in bed.  She will benefit from PT for core and glute stabilization for improved body mechanics and decreased pain with functional mobility.    Pt prognosis is Good.   Pt will benefit from skilled outpatient Physical Therapy to address the deficits stated above and in the chart below, provide pt/family education, and to maximize pt's level of independence.     Plan of care discussed with patient: Yes  Pt's spiritual, cultural and educational needs considered and patient is agreeable to the plan of care and goals as stated below:     Anticipated Barriers for therapy: none    Medical Necessity is demonstrated by the following  History  Co-morbidities and personal factors that may impact the plan of care [] LOW: no personal factors / co-morbidities  [x] MODERATE: 1-2 personal factors / co-morbidities  [] HIGH: 3+ personal factors / co-morbidities    Moderate / High Support Documentation:   Co-morbidities affecting plan of care: chronic pain to low back    Personal  Factors:   lifestyle     Examination  Body Structures and Functions, activity limitations and participation restrictions that may impact the plan of care [] LOW: addressing 1-2 elements  [x] MODERATE: 3+ elements  [] HIGH: 4+ elements (please support below)    Moderate / High Support Documentation: decreased ROM, decreased strength, difficulty with bed mobility     Clinical Presentation [x] LOW: stable  [] MODERATE: Evolving  [] HIGH: Unstable     Decision Making/ Complexity Score: low       GOALS:   Short Term Goals:  4 weeks (progressing, not met)  Pt will report decreased L hip pain to 3/10 in order to increase tolerance for ADLs.  Pt will increase R LE strength by 1/3 muscle grade in all deficient planes for increased ease with ADLs and work activities.  Pt will increase L LE strength by 1/3 muscle grade in all deficient planes for increased ease with ADLs and work activities.  Pt will be independent and consistent with issued HEP in order to show carryover between therapy sessions.    Long Term Goals: 8 weeks (progressing, not met)  Pt will report decreased L hip pain to < or =2/10 in order to increase tolerance for ADLs.  Pt will increase L hip ER ROM to at least 35-40 degrees in order to show improved functional mobility.  Pt will increase R LE strength by 1 muscle grade in all deficient planes  in order to increase tolerance to functional activities and ADLs.  Pt will increase L LE strength by 1 muscle grade in all deficient planes  in order to increase tolerance to functional activities and ADLs.  Pt will be independent with updated HEP to maintain gains following discharge with therapy.  Pt will perform bridge with LAQ and level pelvis to show improved core strength for ADLs.      Plan   Plan of care Certification: 1/23/2024 to 3/22/24.    Outpatient Physical Therapy 2 times weekly for 8 weeks to include the following interventions: Electrical Stimulation  , Gait Training, Manual Therapy, Moist Heat/ Ice,  Neuromuscular Re-ed, Patient Education, Self Care, Therapeutic Activities, Therapeutic Exercise, Ultrasound, and dry needling.     Maida Kendrick, PT

## 2024-01-30 ENCOUNTER — CLINICAL SUPPORT (OUTPATIENT)
Dept: REHABILITATION | Facility: HOSPITAL | Age: 44
End: 2024-01-30
Payer: COMMERCIAL

## 2024-01-30 DIAGNOSIS — R26.89 DECREASED FUNCTIONAL MOBILITY: ICD-10-CM

## 2024-01-30 DIAGNOSIS — M62.81 MUSCLE WEAKNESS: Primary | ICD-10-CM

## 2024-01-30 PROCEDURE — 97112 NEUROMUSCULAR REEDUCATION: CPT | Mod: PN

## 2024-01-30 PROCEDURE — 97110 THERAPEUTIC EXERCISES: CPT | Mod: PN

## 2024-02-05 ENCOUNTER — CLINICAL SUPPORT (OUTPATIENT)
Dept: REHABILITATION | Facility: HOSPITAL | Age: 44
End: 2024-02-05
Payer: COMMERCIAL

## 2024-02-05 DIAGNOSIS — M62.81 MUSCLE WEAKNESS: Primary | ICD-10-CM

## 2024-02-05 DIAGNOSIS — R26.89 DECREASED FUNCTIONAL MOBILITY: ICD-10-CM

## 2024-02-05 PROCEDURE — 97112 NEUROMUSCULAR REEDUCATION: CPT | Mod: PN,CQ

## 2024-02-05 PROCEDURE — 97110 THERAPEUTIC EXERCISES: CPT | Mod: PN,CQ

## 2024-02-05 NOTE — PROGRESS NOTES
[facility]   Physical Therapy Treatment Note      Name: Oneida Villalba  Clinic Number: [id number]    Therapy Diagnosis:   Encounter Diagnoses   Name Primary?    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  She was compliant with home exercise program.  Response to previous treatment: felt okay  Functional change: too soon to tell    Pain: 2-3/10  Location: left hip/low back    Objective      Objective Measures updated at progress report unless specified.     Treatment     Oneida received the treatments listed below:      therapeutic exercises to develop strength for 15 minutes including:  Push/pull 3x3s  Piriformis stretch 3 x 20 sec ea  SL hip abd x15 ea  prone hip ext with knee ext 2x10  Prone hip ext knee bent x 10  SL clams G sport loop 2x10 ea, 3 sec hold    May add:     Oneida participated in neuromuscular re-education activities to improve: Kinesthetic, Sense, Proprioception, and core stabilization for 30 minutes. The following activities were included:  Bridges x10, 5 sec hold  Bridge + march x10, 2 sets  Dead bug x10  Modified side planks (on elbow and knees flexed) 2 x 20 sec ea (more difficult from L SL)  quadruped hip ext x10 ea (DB for hands)-cues for TA set  Quadruped clams x10 ea (DB for hands)cues for TA set  Squats + hip abd iso G sport loop, tap on 18in box (mirror for visual cues to avoid L knee valgus) 2 x 10  Forward step up bottom step x  10 each    May add: side plank + clam,  TA progressions      Patient Education and Home Exercises       Education provided:   - importance of TA set to stabilize  Pt gave verbal understanding to all education provided     Written Home Exercises Provided: yes. Exercises were reviewed and Oneida was able to demonstrate them prior to the end of the session.  Oneida demonstrated good  understanding of the education provided. See Electronic Medical Record under Patient Instructions for exercises provided during therapy sessions    Assessment     Pt presents to clinic with mild stiffness to the hips and low back which lessened with exercises. Occasional cues with forward step ups for knee alignment to prevent valgus and able to self correct. Muscle fatigue by end of session to B hips and glutes. More challenged with R gluteal activation in prone today. She requires cues to maintain TA set with quadruped exercises.  She will continue to benefit from PT for progression of core and glute strengthening and stabilization for improved functional mobility.    Oneida Is progressing well towards her goals.   Patient prognosis is Good.     Patient will continue to benefit from skilled outpatient physical therapy to address the deficits listed in the problem list box on initial evaluation, provide pt/family education and to maximize pt's level of independence in the home and community environment.     Patient's spiritual, cultural and educational needs considered and pt agreeable to plan of care and goals.     Anticipated barriers to physical therapy: none    Goals:   Short Term Goals:  4 weeks (progressing, not met)  Pt will report decreased L hip pain to 3/10 in order to increase tolerance for ADLs.  Pt will increase R LE strength by 1/3 muscle grade in all deficient planes for increased ease with ADLs and work activities.  Pt will increase L LE strength by 1/3 muscle grade in all deficient planes for increased ease with ADLs and  work activities.  Pt will be independent and consistent with issued HEP in order to show carryover between therapy sessions.    Long Term Goals: 8 weeks (progressing, not met)  Pt will report decreased L hip pain to < or =2/10 in order to increase tolerance for ADLs.  Pt will increase L hip ER ROM to at least 35-40 degrees in order to show improved functional mobility.  Pt will increase R LE strength by 1 muscle grade in all deficient planes  in order to increase tolerance to functional activities and ADLs.  Pt will increase L LE strength by 1 muscle grade in all deficient planes  in order to increase tolerance to functional activities and ADLs.  Pt will be independent with updated HEP to maintain gains following discharge with therapy.  Pt will perform bridge with LAQ and level pelvis to show improved core strength for ADLs.    Plan     Continue PT towards established goals.  Progress core and glute strengthening and stabilization.    Plan of care Certification: 1/23/2024 to 3/22/24.    Outpatient Physical Therapy 2 times weekly for 8 weeks to include the following interventions: Electrical Stimulation  , Gait Training, Manual Therapy, Moist Heat/ Ice, Neuromuscular Re-ed, Patient Education, Self Care, Therapeutic Activities, Therapeutic Exercise, Ultrasound, and dry needling.     Serena Prado, PTA

## 2024-02-19 NOTE — PROGRESS NOTES
[facility]   Physical Therapy Treatment Note      Name: Oneida Villalba  Clinic Number: [id number]    Therapy Diagnosis:   Encounter Diagnoses   Name Primary?    Muscle weakness Yes    Decreased functional mobility          Physician: Kane Nunez MD    Visit Date: 2/20/2024    Physician Orders: PT Eval and Treat   Medical Diagnosis from Referral: Left hip pain   Evaluation Date: 1/23/2024  Authorization Period Expiration: 11/5/24  Plan of Care Expiration: 3/22/24  Visit # / Visits authorized: 4/20    PTA Visit #: 1/5     Time In: 10:00  Time Out: 10:45  Total Billable Time: 45 minutes    Precautions: Standard      Subjective     Patient reports: increased pain to L hip over the weekend.  She was on her feet a lot.  She was compliant with home exercise program.  Response to previous treatment: felt okay  Functional change: too soon to tell    Pain: 3/10  Location: left hip/low back    Objective      Objective Measures updated at progress report unless specified.     Treatment     Oenida received the treatments listed below:      therapeutic exercises to develop strength for 15 minutes including:  Push/pull 3x3s  Piriformis stretch 3 x 20 sec ea  SL hip abd x15 ea  NP prone hip ext with knee ext 2x10  NP Prone hip ext knee bent x 10  SL clams G sport loop 2x10 ea, 3 sec hold    May add:     Oneida participated in neuromuscular re-education activities to improve: Kinesthetic, Sense, Proprioception, and core stabilization for 30 minutes. The following activities were included:  Bridges x10, 5 sec hold  Bridge + march x10, 2 sets  Dead bug x15  Modified side planks + clam 2 x 10 ea (more difficult from L SL)  quadruped hip ext with knee flexion 2x10 ea (DB for hands)-cues for TA set  NP Quadruped clams x10 ea (DB for hands)cues for TA set  Hip abd walk G loop, 2 laps ~15 ft  Squats + hip abd iso G sport loop, tap on 18in box (mirror for visual cues to avoid L knee valgus) x20  Forward step up  + contra hip flex 8in 2x10 each    May add: TA progressions      Patient Education and Home Exercises       Education provided:   - importance of TA set to stabilize  Pt gave verbal understanding to all education provided     Written Home Exercises Provided: yes. Exercises were reviewed and Oneida was able to demonstrate them prior to the end of the session.  Oneida demonstrated good  understanding of the education provided. See Electronic Medical Record under Patient Instructions for exercises provided during therapy sessions    Assessment     Pt presented with level pelvis.  She is improving with increased strength.  Side plank + clams were challenging.  She required cues to avoid trunk extension with fwd step ups.  She will continue to benefit from PT for progression of core and glute strengthening and stabilization for improved functional mobility.    Oneida Is progressing well towards her goals.   Patient prognosis is Good.     Patient will continue to benefit from skilled outpatient physical therapy to address the deficits listed in the problem list box on initial evaluation, provide pt/family education and to maximize pt's level of independence in the home and community environment.     Patient's spiritual, cultural and educational needs considered and pt agreeable to plan of care and goals.     Anticipated barriers to physical therapy: none    Goals:   Short Term Goals:  4 weeks (progressing, not met)  Pt will report decreased L hip pain to 3/10 in order to increase tolerance for ADLs.  Pt will increase R LE strength by 1/3 muscle grade in all deficient planes for increased ease with ADLs and work activities.  Pt will increase L LE strength by 1/3 muscle grade in all deficient planes for increased ease with ADLs and work activities.  Pt will be independent and consistent with issued HEP in order to show carryover between therapy sessions.    Long Term Goals: 8 weeks (progressing, not met)  Pt will report  decreased L hip pain to < or =2/10 in order to increase tolerance for ADLs.  Pt will increase L hip ER ROM to at least 35-40 degrees in order to show improved functional mobility.  Pt will increase R LE strength by 1 muscle grade in all deficient planes  in order to increase tolerance to functional activities and ADLs.  Pt will increase L LE strength by 1 muscle grade in all deficient planes  in order to increase tolerance to functional activities and ADLs.  Pt will be independent with updated HEP to maintain gains following discharge with therapy.  Pt will perform bridge with LAQ and level pelvis to show improved core strength for ADLs.    Plan     Continue PT towards established goals.  Progress core and glute strengthening and stabilization.    Plan of care Certification: 1/23/2024 to 3/22/24.    Outpatient Physical Therapy 2 times weekly for 8 weeks to include the following interventions: Electrical Stimulation  , Gait Training, Manual Therapy, Moist Heat/ Ice, Neuromuscular Re-ed, Patient Education, Self Care, Therapeutic Activities, Therapeutic Exercise, Ultrasound, and dry needling.     Maida Kendrick, PT

## 2024-02-20 ENCOUNTER — CLINICAL SUPPORT (OUTPATIENT)
Dept: REHABILITATION | Facility: HOSPITAL | Age: 44
End: 2024-02-20
Payer: COMMERCIAL

## 2024-02-20 DIAGNOSIS — M62.81 MUSCLE WEAKNESS: Primary | ICD-10-CM

## 2024-02-20 DIAGNOSIS — R26.89 DECREASED FUNCTIONAL MOBILITY: ICD-10-CM

## 2024-02-20 PROCEDURE — 97112 NEUROMUSCULAR REEDUCATION: CPT | Mod: PN

## 2024-02-20 PROCEDURE — 97110 THERAPEUTIC EXERCISES: CPT | Mod: PN

## 2024-02-27 ENCOUNTER — CLINICAL SUPPORT (OUTPATIENT)
Dept: REHABILITATION | Facility: HOSPITAL | Age: 44
End: 2024-02-27
Payer: COMMERCIAL

## 2024-02-27 DIAGNOSIS — R26.89 DECREASED FUNCTIONAL MOBILITY: ICD-10-CM

## 2024-02-27 DIAGNOSIS — M62.81 MUSCLE WEAKNESS: Primary | ICD-10-CM

## 2024-02-27 PROCEDURE — 97112 NEUROMUSCULAR REEDUCATION: CPT | Mod: PN,CQ

## 2024-02-27 PROCEDURE — 97110 THERAPEUTIC EXERCISES: CPT | Mod: PN,CQ

## 2024-02-27 PROCEDURE — 97530 THERAPEUTIC ACTIVITIES: CPT | Mod: PN,CQ

## 2024-02-27 NOTE — PROGRESS NOTES
[facility]   Physical Therapy Treatment Note      Name: Oneida Villalba  Clinic Number: [id number]    Therapy Diagnosis:   Encounter Diagnoses   Name Primary?    Muscle weakness Yes    Decreased functional mobility          Physician: Kane Nunez MD    Visit Date: 2/27/2024    Physician Orders: PT Eval and Treat   Medical Diagnosis from Referral: Left hip pain   Evaluation Date: 1/23/2024  Authorization Period Expiration: 11/5/24  Plan of Care Expiration: 3/22/24  Visit # / Visits authorized: 5/20    PTA Visit #: 1/5     Time In: 10:00  Time Out: 10:43  Total Billable Time: 43 minutes    Precautions: Standard      Subjective     Patient reports: mostly having muscle fatigue from doing sports performance at [employer] in [place]. Currently having very mild pain, mostly feeling fatigued from the workouts.   She was compliant with home exercise program.  Response to previous treatment: felt okay  Functional change: able to do more throughout the day    Pain: 2/10  Location: left hip/low back    Objective      Objective Measures updated at progress report unless specified.     Treatment     CC received the treatments listed below:      therapeutic exercises to develop strength for 15 minutes including:  Push/pull 3x3s  Piriformis stretch fig-4 3 x 20 sec ea  Bridges x10, 5 sec hold  Bridge + march x10, 3 sets  SL hip abd x15 ea  NP prone hip ext with knee ext 2x10  NP Prone hip ext knee bent x 10  (NP) SL clams G sport loop 2x10 ea, 3 sec hold    May add:     Oneida participated in neuromuscular re-education activities to improve: Kinesthetic, Sense, Proprioception, and core stabilization for 18 minutes. The following activities were included:  Dead bug 2 x 15  Modified side planks + clam 2 x 10 ea (more difficult from L SL)  quadruped hip ext with knee flexion 2x10 ea (DB for hands)-cues for TA set  NP Quadruped clams x10 ea (DB for hands)cues for TA set  Standing clamshells Y loop 2 x  10  May add: TA progressions    Therapeutic activities to improve functional performance for a total of  x 10 minutes including:  Hip abd walk G loop at ankles, 2 laps ~15 ft - cue to keep knees straight  Squats + hip abd iso G sport loop, tap on 18in box (mirror for visual cues to avoid L knee valgus) x20  Forward step up + contra hip flex 8in 2x10 each    Patient Education and Home Exercises       Education provided:   - importance of TA set to stabilize  Pt gave verbal understanding to all education provided     Written Home Exercises Provided: yes. Exercises were reviewed and CC was able to demonstrate them prior to the end of the session.  CC demonstrated good  understanding of the education provided. See Electronic Medical Record under Patient Instructions for exercises provided during therapy sessions    Assessment     Pt tolerated progression of exercises well, some challenge with stability with standing clamshells today but able to complete well. Muscle fatigue by end of session, making progress with overall strength and endurance. Improving mechanics with forward step ups. She will continue to benefit from PT for progression of core and glute strengthening and stabilization for improved functional mobility.    CC Is progressing well towards her goals.   Patient prognosis is Good.     Patient will continue to benefit from skilled outpatient physical therapy to address the deficits listed in the problem list box on initial evaluation, provide pt/family education and to maximize pt's level of independence in the home and community environment.     Patient's spiritual, cultural and educational needs considered and pt agreeable to plan of care and goals.     Anticipated barriers to physical therapy: none    Goals:   Short Term Goals:  4 weeks (progressing, not met)  Pt will report decreased L hip pain to 3/10 in order to increase tolerance for ADLs.  Pt will increase R LE strength by 1/3 muscle grade in all  deficient planes for increased ease with ADLs and work activities.  Pt will increase L LE strength by 1/3 muscle grade in all deficient planes for increased ease with ADLs and work activities.  Pt will be independent and consistent with issued HEP in order to show carryover between therapy sessions.    Long Term Goals: 8 weeks (progressing, not met)  Pt will report decreased L hip pain to < or =2/10 in order to increase tolerance for ADLs.  Pt will increase L hip ER ROM to at least 35-40 degrees in order to show improved functional mobility.  Pt will increase R LE strength by 1 muscle grade in all deficient planes  in order to increase tolerance to functional activities and ADLs.  Pt will increase L LE strength by 1 muscle grade in all deficient planes  in order to increase tolerance to functional activities and ADLs.  Pt will be independent with updated HEP to maintain gains following discharge with therapy.  Pt will perform bridge with LAQ and level pelvis to show improved core strength for ADLs.    Plan     Continue PT towards established goals.  Progress core and glute strengthening and stabilization.    Plan of care Certification: 1/23/2024 to 3/22/24.    Outpatient Physical Therapy 2 times weekly for 8 weeks to include the following interventions: Electrical Stimulation  , Gait Training, Manual Therapy, Moist Heat/ Ice, Neuromuscular Re-ed, Patient Education, Self Care, Therapeutic Activities, Therapeutic Exercise, Ultrasound, and dry needling.     Serena Prado, PTA

## 2024-03-05 ENCOUNTER — CLINICAL SUPPORT (OUTPATIENT)
Dept: REHABILITATION | Facility: HOSPITAL | Age: 44
End: 2024-03-05
Payer: COMMERCIAL

## 2024-03-05 DIAGNOSIS — M62.81 MUSCLE WEAKNESS: Primary | ICD-10-CM

## 2024-03-05 DIAGNOSIS — R26.89 DECREASED FUNCTIONAL MOBILITY: ICD-10-CM

## 2024-03-05 PROCEDURE — 97112 NEUROMUSCULAR REEDUCATION: CPT | Mod: PN

## 2024-03-05 PROCEDURE — 97110 THERAPEUTIC EXERCISES: CPT | Mod: PN

## 2024-03-05 PROCEDURE — 97530 THERAPEUTIC ACTIVITIES: CPT | Mod: PN

## 2024-03-05 NOTE — PROGRESS NOTES
[facility]   Physical Therapy Treatment Note      Name: Oneida Villalba  Clinic Number: [id number]    Therapy Diagnosis:   Encounter Diagnoses   Name Primary?    Muscle weakness Yes    Decreased functional mobility            Physician: Kane Nunez MD    Visit Date: 3/5/2024    Physician Orders: PT Eval and Treat   Medical Diagnosis from Referral: Left hip pain   Evaluation Date: 1/23/2024  Authorization Period Expiration: 11/5/24  Plan of Care Expiration: 3/22/24  Visit # / Visits authorized: 6/20    PTA Visit #: 1/5     Time In: 9:45  Time Out: 10:30  Total Billable Time: 45 minutes    Precautions: Standard      Subjective     Patient reports: she just finished working out with sports performance prior to visit.    She was compliant with home exercise program.  Response to previous treatment: felt okay  Functional change: able to do more throughout the day    Pain: 1-2/10  Location: left hip/low back    Objective      Objective Measures updated at progress report unless specified.     Treatment     CC received the treatments listed below:      therapeutic exercises to develop strength for 15 minutes including:  Push/pull 3x3s  Piriformis stretch fig-4 3 x 20 sec ea  Bridges x10, 5 sec hold  Bridge + march x10, 3 sets  SL hip abd x15 ea  NP prone hip ext with knee ext 2x10  NP Prone hip ext knee bent x 10  (NP) SL clams G sport loop 2x10 ea, 3 sec hold    Oneida participated in neuromuscular re-education activities to improve: Kinesthetic, Sense, Proprioception, and core stabilization for 18 minutes. The following activities were included:  Dead bug 2 x 15  Modified side planks + clam 2 x 10 ea (more difficult from L SL)  quadruped hip ext with knee flexion 2x10 ea -cues for TA set  NP Quadruped clams x10 ea (DB for hands)cues for TA set  Standing clamshells Y loop 2 x 10    Therapeutic activities to improve functional performance for a total of  x 12 minutes including:  Hip abd walk G  loop at ankles, 2 laps ~15 ft - cue to keep knees straight  Squats + hip abd iso G sport loop, tap on 18in box (mirror for visual cues to avoid L knee valgus) x20  Forward step up + contra hip flex + 10# KB 8in 2x10 each    Patient Education and Home Exercises       Education provided:   - importance of TA set to stabilize  Pt gave verbal understanding to all education provided     Written Home Exercises Provided: yes. Exercises were reviewed and CC was able to demonstrate them prior to the end of the session.  CC demonstrated good  understanding of the education provided. See Electronic Medical Record under Patient Instructions for exercises provided during therapy sessions    Assessment     Pt continues to improve with increased LE and core strength.  She was able to tolerate progression of forward step ups to challenge core.  She will continue to benefit from PT for progression of core and glute strengthening and stabilization for improved functional mobility.    CC Is progressing well towards her goals.   Patient prognosis is Good.     Patient will continue to benefit from skilled outpatient physical therapy to address the deficits listed in the problem list box on initial evaluation, provide pt/family education and to maximize pt's level of independence in the home and community environment.     Patient's spiritual, cultural and educational needs considered and pt agreeable to plan of care and goals.     Anticipated barriers to physical therapy: none    Goals:   Short Term Goals:  4 weeks (progressing, not met)  Pt will report decreased L hip pain to 3/10 in order to increase tolerance for ADLs.  Pt will increase R LE strength by 1/3 muscle grade in all deficient planes for increased ease with ADLs and work activities.  Pt will increase L LE strength by 1/3 muscle grade in all deficient planes for increased ease with ADLs and work activities.  Pt will be independent and consistent with issued HEP in order to  show carryover between therapy sessions.    Long Term Goals: 8 weeks (progressing, not met)  Pt will report decreased L hip pain to < or =2/10 in order to increase tolerance for ADLs.  Pt will increase L hip ER ROM to at least 35-40 degrees in order to show improved functional mobility.  Pt will increase R LE strength by 1 muscle grade in all deficient planes  in order to increase tolerance to functional activities and ADLs.  Pt will increase L LE strength by 1 muscle grade in all deficient planes  in order to increase tolerance to functional activities and ADLs.  Pt will be independent with updated HEP to maintain gains following discharge with therapy.  Pt will perform bridge with LAQ and level pelvis to show improved core strength for ADLs.    Plan     Continue PT towards established goals.  Progress core and glute strengthening and stabilization.    Plan of care Certification: 1/23/2024 to 3/22/24.    Outpatient Physical Therapy 2 times weekly for 8 weeks to include the following interventions: Electrical Stimulation  , Gait Training, Manual Therapy, Moist Heat/ Ice, Neuromuscular Re-ed, Patient Education, Self Care, Therapeutic Activities, Therapeutic Exercise, Ultrasound, and dry needling.     Maida Kendrick, PT

## 2024-03-11 ENCOUNTER — CLINICAL SUPPORT (OUTPATIENT)
Dept: REHABILITATION | Facility: HOSPITAL | Age: 44
End: 2024-03-11
Attending: ORTHOPAEDIC SURGERY
Payer: COMMERCIAL

## 2024-03-11 DIAGNOSIS — R26.89 DECREASED FUNCTIONAL MOBILITY: ICD-10-CM

## 2024-03-11 DIAGNOSIS — M62.81 MUSCLE WEAKNESS: Primary | ICD-10-CM

## 2024-03-11 PROCEDURE — 97112 NEUROMUSCULAR REEDUCATION: CPT | Mod: PN

## 2024-03-11 PROCEDURE — 97530 THERAPEUTIC ACTIVITIES: CPT | Mod: PN

## 2024-03-11 PROCEDURE — 97110 THERAPEUTIC EXERCISES: CPT | Mod: PN

## 2024-03-11 NOTE — PLAN OF CARE
[facility]   Discharge Summary and Physical Therapy Treatment Note      Name: Oneida Villalba  [facility] Number: [id number]    Therapy Diagnosis:   Encounter Diagnoses   Name Primary?    Muscle weakness Yes    Decreased functional mobility        Physician: Kane Nunez MD    Visit Date: 3/11/2024    Physician Orders: PT Eval and Treat   Medical Diagnosis from Referral: Left hip pain   Evaluation Date: 1/23/2024  Authorization Period Expiration: 11/5/24  Plan of Care Expiration: 3/22/24  Visit # / Visits authorized: 6/20    PTA Visit #: 1/5     Time In: 8:15  Time Out: 9:00  Total Billable Time: 45 minutes    Precautions: Standard      Subjective     Patient reports: she still gets some pain to L hip at times (2/10 at its worst).  Response to previous treatment: felt okay  Functional change: able to do more throughout the day    Pain: 2/10  Location: left hip/low back    Objective      Lumbar ROM:  Flex:100  Ext: 100   R SB: 100  L SB: 100  R rot: 75  L rot: 75      Range of Motion: (deg)  hip Left active Right Active   Flexion 115 115   IR 45 45   ER 37 39       Lower Extremity Strength  Left LE  Right LE    Knee extension: 5/5 Knee extension: 5/5   Knee flexion: 5/5 Knee flexion: 5/5   Hip flexion: 5/5 Hip flexion: 5/5   Hip extension:  4+/5 Hip extension: 4+/5   Hip abduction: 4+/5 Hip abduction: 4+/5   Hip adduction: 5/5 Hip adduction 5/5   Ankle dorsiflexion: 5/5 Ankle dorsiflexion: 5/5       Special Tests:  FABERs: - B  Bettye's: - B    Bridge test: pt able to perform bridge + LAQ with level pelvis    Function:  - Squat: able to squat, but presents with L knee valgus   - Sit <--> Stand:able to stand from chair without UE support   - Bed Mobility: I with all aspects    Joint Mobility: slight hypomobility to L hip jt     Palpation: TTP to L glute med    Sensation: grossly intact to light touch    Flexibility:    Popliteal Angle: R = -5 degrees ; L = -5 degrees   Bettye's test: R = - ; L =  -    FOTO: 99    Treatment     CC received the treatments listed below:      therapeutic exercises to develop strength for 25 minutes including:  Reassessment  Push/pull 3x3s  Piriformis stretch fig-4 3 x 20 sec ea  Bridges x10, 5 sec hold  Bridge + march x10, 3 sets  Reviewed HEP    Oneida participated in neuromuscular re-education activities to improve: Kinesthetic, Sense, Proprioception, and core stabilization for 10 minutes. The following activities were included:  Dead bug 2 x 15  Modified side planks + clam 2 x 10 ea (more difficult from L SL)    Therapeutic activities to improve functional performance for a total of  x 10 minutes including:  Forward step up + contra hip flex + 10# KB 8in 2x10 each  Pt ascended/descended 12 steps with 1 rail mod I  Pt performed squat lift floor to waist with orange tball and 10# KB    Patient Education and Home Exercises       Education provided:   - pt instructed to continue HEP upon discharge and continue performance training  Pt gave verbal understanding to all education provided     Written Home Exercises Provided: pt instructed to continue previous HEP. Exercises were reviewed and CC was able to demonstrate them prior to the end of the session.  CC demonstrated good  understanding of the education provided. See Electronic Medical Record under Patient Instructions for exercises provided during therapy sessions    Assessment     Pt met goals and is safe for discharge to continue to self manage with HEP.    Patient's spiritual, cultural and educational needs considered and pt agreeable to plan of care and goals.     Anticipated barriers to physical therapy: none    Goals:   Short Term Goals:  4 weeks   Pt will report decreased L hip pain to 3/10 in order to increase tolerance for ADLs. (Met)  Pt will increase R LE strength by 1/3 muscle grade in all deficient planes for increased ease with ADLs and work activities. (Met)  Pt will increase L LE strength by 1/3 muscle grade in  all deficient planes for increased ease with ADLs and work activities. (Met)  Pt will be independent and consistent with issued HEP in order to show carryover between therapy sessions. (Met)    Long Term Goals: 8 weeks   Pt will report decreased L hip pain to < or =2/10 in order to increase tolerance for ADLs.(Met)  Pt will increase L hip ER ROM to at least 35-40 degrees in order to show improved functional mobility. (Met)  Pt will increase R LE strength by 1 muscle grade in all deficient planes  in order to increase tolerance to functional activities and ADLs. (Partially met)  Pt will increase L LE strength by 1 muscle grade in all deficient planes  in order to increase tolerance to functional activities and ADLs. (Partially met)  Pt will be independent with updated HEP to maintain gains following discharge with therapy. (Met)  Pt will perform bridge with LAQ and level pelvis to show improved core strength for ADLs. (Met)    Plan     Pt discharged from PT to continue to self manage with HEP and performance training.    Maida Kendrick, PT

## 2024-03-26 ENCOUNTER — OFFICE VISIT (OUTPATIENT)
Dept: OBSTETRICS AND GYNECOLOGY | Facility: CLINIC | Age: 44
End: 2024-03-26
Payer: COMMERCIAL

## 2024-03-26 ENCOUNTER — LAB VISIT (OUTPATIENT)
Dept: LAB | Facility: HOSPITAL | Age: 44
End: 2024-03-26
Attending: STUDENT IN AN ORGANIZED HEALTH CARE EDUCATION/TRAINING PROGRAM
Payer: COMMERCIAL

## 2024-03-26 VITALS
DIASTOLIC BLOOD PRESSURE: 84 MMHG | WEIGHT: 194.69 LBS | HEIGHT: 67 IN | HEART RATE: 102 BPM | BODY MASS INDEX: 30.56 KG/M2 | SYSTOLIC BLOOD PRESSURE: 122 MMHG

## 2024-03-26 DIAGNOSIS — Z00.00 WELL ADULT EXAM: Primary | ICD-10-CM

## 2024-03-26 DIAGNOSIS — N92.3 INTERMENSTRUAL SPOTTING: ICD-10-CM

## 2024-03-26 LAB
FSH SERPL-ACNC: 5.22 MIU/ML
TSH SERPL DL<=0.005 MIU/L-ACNC: 1.22 UIU/ML (ref 0.4–4)

## 2024-03-26 PROCEDURE — 99214 OFFICE O/P EST MOD 30 MIN: CPT | Mod: 25,S$GLB,, | Performed by: STUDENT IN AN ORGANIZED HEALTH CARE EDUCATION/TRAINING PROGRAM

## 2024-03-26 PROCEDURE — 99396 PREV VISIT EST AGE 40-64: CPT | Mod: S$GLB,,, | Performed by: STUDENT IN AN ORGANIZED HEALTH CARE EDUCATION/TRAINING PROGRAM

## 2024-03-26 PROCEDURE — 88175 CYTOPATH C/V AUTO FLUID REDO: CPT | Performed by: STUDENT IN AN ORGANIZED HEALTH CARE EDUCATION/TRAINING PROGRAM

## 2024-03-26 PROCEDURE — 83001 ASSAY OF GONADOTROPIN (FSH): CPT | Performed by: STUDENT IN AN ORGANIZED HEALTH CARE EDUCATION/TRAINING PROGRAM

## 2024-03-26 PROCEDURE — 87624 HPV HI-RISK TYP POOLED RSLT: CPT | Performed by: STUDENT IN AN ORGANIZED HEALTH CARE EDUCATION/TRAINING PROGRAM

## 2024-03-26 PROCEDURE — 1159F MED LIST DOCD IN RCRD: CPT | Mod: CPTII,S$GLB,, | Performed by: STUDENT IN AN ORGANIZED HEALTH CARE EDUCATION/TRAINING PROGRAM

## 2024-03-26 PROCEDURE — 84443 ASSAY THYROID STIM HORMONE: CPT | Performed by: STUDENT IN AN ORGANIZED HEALTH CARE EDUCATION/TRAINING PROGRAM

## 2024-03-26 PROCEDURE — 3008F BODY MASS INDEX DOCD: CPT | Mod: CPTII,S$GLB,, | Performed by: STUDENT IN AN ORGANIZED HEALTH CARE EDUCATION/TRAINING PROGRAM

## 2024-03-26 PROCEDURE — 99999 PR PBB SHADOW E&M-EST. PATIENT-LVL III: CPT | Mod: PBBFAC,,, | Performed by: STUDENT IN AN ORGANIZED HEALTH CARE EDUCATION/TRAINING PROGRAM

## 2024-03-26 PROCEDURE — 3074F SYST BP LT 130 MM HG: CPT | Mod: CPTII,S$GLB,, | Performed by: STUDENT IN AN ORGANIZED HEALTH CARE EDUCATION/TRAINING PROGRAM

## 2024-03-26 PROCEDURE — 36415 COLL VENOUS BLD VENIPUNCTURE: CPT | Mod: PN | Performed by: STUDENT IN AN ORGANIZED HEALTH CARE EDUCATION/TRAINING PROGRAM

## 2024-03-26 PROCEDURE — 3079F DIAST BP 80-89 MM HG: CPT | Mod: CPTII,S$GLB,, | Performed by: STUDENT IN AN ORGANIZED HEALTH CARE EDUCATION/TRAINING PROGRAM

## 2024-03-26 NOTE — PROGRESS NOTES
History & Physical  Gynecology      SUBJECTIVE:     Chief Complaint: Establish Care and Well Woman       History of Present Illness:    Here today for well woman exam. Would also like to discuss intermenstrual spotting past few months.     Gyn: regular periods, now intermittently spotting between them. No contraception. Remote hx of abnormal pap 10 years ago.  had tonsil HPV cancer. No immediate FH of gyn or colon cancer    No tobacco    [place]     Review of patient's allergies indicates:   Allergen Reactions    Benson Anaphylaxis, Blisters, Dermatitis, Hives, Itching, Rash and Swelling    Mangosteen peel Swelling       Past Medical History:   Diagnosis Date    Abnormal Pap smear of cervix     Heart murmur      Past Surgical History:   Procedure Laterality Date     SECTION      Egg Retreival      two times    HYSTEROSCOPY       OB History          1    Para   1    Term   1            AB        Living   1         SAB        IAB        Ectopic        Multiple        Live Births   1               Family History   Problem Relation Age of Onset    No Known Problems Paternal Grandfather     Colon cancer Paternal Grandmother     Cancer Paternal Grandmother     No Known Problems Maternal Grandmother     No Known Problems Maternal Grandfather     No Known Problems Father     No Known Problems Mother     No Known Problems Brother     No Known Problems Sister     No Known Problems Maternal Aunt     No Known Problems Maternal Uncle     No Known Problems Paternal Aunt     No Known Problems Paternal Uncle     Amblyopia Neg Hx     Blindness Neg Hx     Cataracts Neg Hx     Glaucoma Neg Hx     Macular degeneration Neg Hx     Retinal detachment Neg Hx     Strabismus Neg Hx     Diabetes Neg Hx     Hypertension Neg Hx     Stroke Neg Hx     Thyroid disease Neg Hx     Breast cancer Neg Hx     Uterine cancer Neg Hx     Cervical cancer Neg Hx     Ovarian cancer Neg Hx      Social History      Tobacco Use    Smoking status: Former     Current packs/day: 0.00     Types: Cigarettes     Quit date: 12/10/2003     Years since quittin.3    Smokeless tobacco: Never   Substance Use Topics    Alcohol use: Yes    Drug use: No       No current outpatient medications on file.     No current facility-administered medications for this visit.         Review of Systems:  Review of Systems   Constitutional:  Negative for chills, fatigue and fever.   HENT:  Negative for congestion.    Eyes:  Negative for visual disturbance.   Respiratory:  Negative for cough and shortness of breath.    Cardiovascular:  Negative for chest pain and palpitations.   Gastrointestinal:  Negative for abdominal distention, abdominal pain, constipation, diarrhea, nausea and vomiting.   Genitourinary:  Negative for difficulty urinating, dysuria, hematuria, vaginal bleeding and vaginal discharge.   Skin:  Negative for rash.   Neurological:  Negative for dizziness, seizures, light-headedness and headaches.   Hematological:  Does not bruise/bleed easily.   Psychiatric/Behavioral:  Negative for dysphoric mood. The patient is not nervous/anxious.         OBJECTIVE:     Physical Exam:  Physical Exam  Vitals reviewed.   Constitutional:       General: She is not in acute distress.     Appearance: Normal appearance. She is well-developed.   HENT:      Head: Normocephalic and atraumatic.   Cardiovascular:      Rate and Rhythm: Normal rate and regular rhythm.   Pulmonary:      Effort: Pulmonary effort is normal.   Chest:   Breasts:     Right: No inverted nipple, mass, nipple discharge, skin change or tenderness.      Left: No inverted nipple, mass, nipple discharge, skin change or tenderness.   Abdominal:      General: There is no distension.      Palpations: Abdomen is soft.      Tenderness: There is no abdominal tenderness.   Genitourinary:     Vagina: Normal.      Comments: Normal external female genitalia, normal hair distribution. Vaginal mucosa  pink, moist, well rugated, scant white physiologic discharge. No blood in vault. Cervix pink, non-friable, without lesion. No CMT. Uterus non tender, mobile, not enlarged. Adnexa without fullness or tenderness.    Skin:     General: Skin is warm.   Neurological:      Mental Status: She is alert and oriented to person, place, and time.   Psychiatric:         Behavior: Behavior normal.         Thought Content: Thought content normal.         Judgment: Judgment normal.           ASSESSMENT:       ICD-10-CM ICD-9-CM    1. Well adult exam  Z00.00 V70.0 Ambulatory referral/consult to Obstetrics / Gynecology      Liquid-Based Pap Smear, Screening      HPV High Risk Genotypes, PCR      2. Intermenstrual spotting  N92.3 626.6 TSH      Follicle Stimulating Hormone      US Pelvis Comp with Transvag NON-OB (xpd          Orders Placed This Encounter   Procedures    HPV High Risk Genotypes, PCR     Release to patient->Immediate     Order Specific Question:   Source     Answer:   Cervix     Order Specific Question:   Release to patient     Answer:   Immediate    US Pelvis Comp with Transvag NON-OB (xpd     Standing Status:   Future     Standing Expiration Date:   3/26/2025     Order Specific Question:   May the Radiologist modify the order per protocol to meet the clinical needs of the patient?     Answer:   Yes     Order Specific Question:   Release to patient     Answer:   Immediate    TSH     Standing Status:   Future     Standing Expiration Date:   3/26/2025    Follicle Stimulating Hormone     Standing Status:   Future     Standing Expiration Date:   6/24/2025           Plan:      Well woman:  - - Pap cotest today   - MMG s/p  - colonoscopy has scheduled   - tobacco cessation n/a  - contraception declines   - Counseled to take daily multivitamin. If patient is of reproductive age and not on contraception, to take prenatal vitamin. Patient has been counseled on the vitamin D and calcium requirements per ACOG  recommendations.  Age   Calcium(mg/day)   Vitamin D (IU/day)  9-18    1300                       600  19-50  1000                       600  51-70  1200                       600  >70      1,200                      800  Patient to start vit D    CC: intermenstrual spotting. Light, past few months. No PCB. Sometimes spotting when lifting heavier weights.   A/P:  - labs/TVUS ordered  - likely perimenopausal bleeding vs endometrial polyp  - f/u pending labs and TVUS    Greta Silverman M.D.  Obstetrics and Gynecology

## 2024-03-28 LAB
HPV HR 12 DNA SPEC QL NAA+PROBE: NEGATIVE
HPV16 AG SPEC QL: NEGATIVE
HPV18 DNA SPEC QL NAA+PROBE: NEGATIVE

## 2024-04-01 LAB
FINAL PATHOLOGIC DIAGNOSIS: NORMAL
Lab: NORMAL

## 2024-04-08 ENCOUNTER — TELEPHONE (OUTPATIENT)
Dept: OBSTETRICS AND GYNECOLOGY | Facility: CLINIC | Age: 44
End: 2024-04-08
Payer: COMMERCIAL

## 2024-04-08 NOTE — TELEPHONE ENCOUNTER
Patient needed to reschedule ultrasound. Ultrasound rescheduled. Patient verbalized understanding.    ----- Message from Nadira Sun sent at 4/8/2024  2:25 PM CDT -----  Contact: patient  Type:  Sooner Apoointment Request    Caller is requesting a sooner appointment.  Caller declined first available appointment listed below.  Caller will not accept being placed on the waitlist and is requesting a message be sent to doctor.    Name of Caller:patient     When is the first available appointment?    Symptoms:us     Would the patient rather a call back or a response via [facility]? Call     Best Call Back Number:[phone] (home)      Additional Information:  reschedule

## 2024-04-15 ENCOUNTER — TELEPHONE (OUTPATIENT)
Dept: ENDOSCOPY | Facility: HOSPITAL | Age: 44
End: 2024-04-15
Payer: COMMERCIAL

## 2024-04-15 NOTE — TELEPHONE ENCOUNTER
"Spoke with pt. Informed our office does not do "pill preps". Discussed preps with pt. Pt verbalized understanding   "

## 2024-04-17 ENCOUNTER — ANESTHESIA (OUTPATIENT)
Dept: ENDOSCOPY | Facility: HOSPITAL | Age: 44
End: 2024-04-17
Payer: COMMERCIAL

## 2024-04-17 ENCOUNTER — ANESTHESIA EVENT (OUTPATIENT)
Dept: ENDOSCOPY | Facility: HOSPITAL | Age: 44
End: 2024-04-17
Payer: COMMERCIAL

## 2024-04-17 ENCOUNTER — HOSPITAL ENCOUNTER (OUTPATIENT)
Facility: HOSPITAL | Age: 44
Discharge: HOME | End: 2024-04-17
Attending: COLON & RECTAL SURGERY | Admitting: COLON & RECTAL SURGERY
Payer: COMMERCIAL

## 2024-04-17 VITALS
WEIGHT: 185 LBS | TEMPERATURE: 98 F | DIASTOLIC BLOOD PRESSURE: 77 MMHG | RESPIRATION RATE: 18 BRPM | HEIGHT: 67 IN | HEART RATE: 72 BPM | BODY MASS INDEX: 29.03 KG/M2 | OXYGEN SATURATION: 95 % | SYSTOLIC BLOOD PRESSURE: 139 MMHG

## 2024-04-17 DIAGNOSIS — Z12.11 SCREEN FOR COLON CANCER: Primary | ICD-10-CM

## 2024-04-17 LAB
B-HCG UR QL: NEGATIVE
CTP QC/QA: YES

## 2024-04-17 PROCEDURE — 88305 TISSUE EXAM BY PATHOLOGIST: CPT | Mod: 26,,, | Performed by: STUDENT IN AN ORGANIZED HEALTH CARE EDUCATION/TRAINING PROGRAM

## 2024-04-17 PROCEDURE — 37000009 HC ANESTHESIA EA ADD 15 MINS: Mod: PO | Performed by: COLON & RECTAL SURGERY

## 2024-04-17 PROCEDURE — 37000008 HC ANESTHESIA 1ST 15 MINUTES: Mod: PO | Performed by: COLON & RECTAL SURGERY

## 2024-04-17 PROCEDURE — D9220A PRA ANESTHESIA: Mod: 33,CRNA,, | Performed by: NURSE ANESTHETIST, CERTIFIED REGISTERED

## 2024-04-17 PROCEDURE — 63600175 PHARM REV CODE 636 W HCPCS: Mod: PO | Performed by: NURSE ANESTHETIST, CERTIFIED REGISTERED

## 2024-04-17 PROCEDURE — 27201089 HC SNARE, DISP (ANY): Mod: PO | Performed by: COLON & RECTAL SURGERY

## 2024-04-17 PROCEDURE — 88305 TISSUE EXAM BY PATHOLOGIST: CPT | Mod: PO | Performed by: STUDENT IN AN ORGANIZED HEALTH CARE EDUCATION/TRAINING PROGRAM

## 2024-04-17 PROCEDURE — 45385 COLONOSCOPY W/LESION REMOVAL: CPT | Mod: 33,,, | Performed by: COLON & RECTAL SURGERY

## 2024-04-17 PROCEDURE — 25000003 PHARM REV CODE 250: Mod: PO | Performed by: NURSE ANESTHETIST, CERTIFIED REGISTERED

## 2024-04-17 PROCEDURE — 81025 URINE PREGNANCY TEST: CPT | Mod: PO | Performed by: COLON & RECTAL SURGERY

## 2024-04-17 PROCEDURE — D9220A PRA ANESTHESIA: Mod: 33,ANES,, | Performed by: ANESTHESIOLOGY

## 2024-04-17 PROCEDURE — 45385 COLONOSCOPY W/LESION REMOVAL: CPT | Mod: 33,PO | Performed by: COLON & RECTAL SURGERY

## 2024-04-17 PROCEDURE — 63600175 PHARM REV CODE 636 W HCPCS: Mod: PO | Performed by: COLON & RECTAL SURGERY

## 2024-04-17 RX ORDER — SODIUM CHLORIDE, SODIUM LACTATE, POTASSIUM CHLORIDE, CALCIUM CHLORIDE 600; 310; 30; 20 MG/100ML; MG/100ML; MG/100ML; MG/100ML
INJECTION, SOLUTION INTRAVENOUS CONTINUOUS
Status: DISCONTINUED | OUTPATIENT
Start: 2024-04-17 | End: 2024-04-17 | Stop reason: HOSPADM

## 2024-04-17 RX ORDER — PROPOFOL 10 MG/ML
VIAL (ML) INTRAVENOUS CONTINUOUS PRN
Status: DISCONTINUED | OUTPATIENT
Start: 2024-04-17 | End: 2024-04-17

## 2024-04-17 RX ORDER — SODIUM CHLORIDE 0.9 % (FLUSH) 0.9 %
10 SYRINGE (ML) INJECTION
Status: DISCONTINUED | OUTPATIENT
Start: 2024-04-17 | End: 2024-04-17 | Stop reason: HOSPADM

## 2024-04-17 RX ORDER — PROPOFOL 10 MG/ML
VIAL (ML) INTRAVENOUS
Status: DISCONTINUED | OUTPATIENT
Start: 2024-04-17 | End: 2024-04-17

## 2024-04-17 RX ORDER — LIDOCAINE HYDROCHLORIDE 20 MG/ML
INJECTION INTRAVENOUS
Status: DISCONTINUED | OUTPATIENT
Start: 2024-04-17 | End: 2024-04-17

## 2024-04-17 RX ADMIN — PROPOFOL 50 MG: 10 INJECTION, EMULSION INTRAVENOUS at 09:04

## 2024-04-17 RX ADMIN — PROPOFOL 100 MG: 10 INJECTION, EMULSION INTRAVENOUS at 08:04

## 2024-04-17 RX ADMIN — SODIUM CHLORIDE, POTASSIUM CHLORIDE, SODIUM LACTATE AND CALCIUM CHLORIDE: 600; 310; 30; 20 INJECTION, SOLUTION INTRAVENOUS at 08:04

## 2024-04-17 RX ADMIN — PROPOFOL 50 MG: 10 INJECTION, EMULSION INTRAVENOUS at 08:04

## 2024-04-17 RX ADMIN — PROPOFOL 150 MCG/KG/MIN: 10 INJECTION, EMULSION INTRAVENOUS at 08:04

## 2024-04-17 RX ADMIN — LIDOCAINE HYDROCHLORIDE 100 MG: 20 INJECTION INTRAVENOUS at 08:04

## 2024-04-17 NOTE — ANESTHESIA PREPROCEDURE EVALUATION
04/17/2024  Oneida Villalba is a 44 y.o., female.      Pre-op Assessment    I have reviewed the Patient Summary Reports.     I have reviewed the Nursing Notes. I have reviewed the NPO Status.   I have reviewed the Medications.     Review of Systems  Cardiovascular:  Cardiovascular Normal                                            Pulmonary:  Pulmonary Normal                       Renal/:  Renal/ Normal                 Hepatic/GI:  Bowel Prep.                Neurological:  Neurology Normal                                      Endocrine:  Endocrine Normal                Physical Exam  General: Well nourished    Airway:  Mallampati: II   Neck ROM: Normal ROM    Dental:  Intact        Anesthesia Plan  Type of Anesthesia, risks & benefits discussed:    Anesthesia Type: Gen Natural Airway  Intra-op Monitoring Plan: Standard ASA Monitors  Induction:  IV  Informed Consent: Informed consent signed with the Patient and all parties understand the risks and agree with anesthesia plan.  All questions answered.   ASA Score: 1    Ready For Surgery From Anesthesia Perspective.     .

## 2024-04-17 NOTE — ANESTHESIA POSTPROCEDURE EVALUATION
Anesthesia Post Evaluation    Patient: Oneida Villlaba    Procedure(s) Performed: Procedure(s) (LRB):  COLONOSCOPY (N/A)    Final Anesthesia Type: general      Patient location during evaluation: PACU  Patient participation: Yes- Able to Participate  Level of consciousness: sedated and awake  Post-procedure vital signs: reviewed and stable  Pain management: adequate  Airway patency: patent    PONV status at discharge: No PONV  Anesthetic complications: no      Cardiovascular status: blood pressure returned to baseline and hemodynamically stable  Respiratory status: spontaneous ventilation  Hydration status: euvolemic  Follow-up not needed.              Vitals Value Taken Time   /77 04/17/24 0934   Temp  04/17/24 0959   Pulse 72 04/17/24 0934   Resp 18 04/17/24 0934   SpO2 95 % 04/17/24 0934         No case tracking events are documented in the log.      Pain/Janay Score: Janay Score: 10 (4/17/2024  9:27 AM)

## 2024-04-17 NOTE — PROVATION PATIENT INSTRUCTIONS
Discharge Summary/Instructions after an Endoscopic Procedure  Patient Name: Oneida Villalba  Patient MRN: [id number]  Patient YOB: 1980  Wednesday, April 17, 2024  Thanh De Santiago MD  Dear patient,  As a result of recent federal legislation (The Federal Cures Act), you may   receive lab or pathology results from your procedure in your [facility]   account before your physician is able to contact you. Your physician or   their representative will relay the results to you with their   recommendations at their soonest availability.  Thank you,  RESTRICTIONS:  During your procedure today, you received medications for sedation.  These   medications may affect your judgment, balance and coordination.  Therefore,   for 24 hours, you have the following restrictions:   - DO NOT drive a car, operate machinery, make legal/financial decisions,   sign important papers or drink alcohol.    ACTIVITY:  Today: no heavy lifting, straining or running due to procedural   sedation/anesthesia.  The following day: return to full activity including work.  DIET:  Eat and drink normally unless instructed otherwise.     TREATMENT FOR COMMON SIDE EFFECTS:  - Mild abdominal pain, nausea, belching, bloating or excessive gas:  rest,   eat lightly and use a heating pad.  - Sore Throat: treat with throat lozenges and/or gargle with warm salt   water.  - Because air was used during the procedure, expelling large amounts of air   from your rectum or belching is normal.  - If a bowel prep was taken, you may not have a bowel movement for 1-3 days.    This is normal.  SYMPTOMS TO WATCH FOR AND REPORT TO YOUR PHYSICIAN:  1. Abdominal pain or bloating, other than gas cramps.  2. Chest pain.  3. Back pain.  4. Signs of infection such as: chills or fever occurring within 24 hours   after the procedure.  5. Rectal bleeding, which would show as bright red, maroon, or black stools.   (A tablespoon of blood from the rectum is not serious, especially if    hemorrhoids are present.)  6. Vomiting.  7. Weakness or dizziness.  GO DIRECTLY TO THE NEAREST EMERGENCY ROOM IF YOU HAVE ANY OF THE FOLLOWING:      Difficulty breathing              Chills and/or fever over 101 F   Persistent vomiting and/or vomiting blood   Severe abdominal pain   Severe chest pain   Black, tarry stools   Bleeding- more than one tablespoon   Any other symptom or condition that you feel may need urgent attention  Your doctor recommends these additional instructions:  If any biopsies were taken, your doctors clinic will contact you in 1 to 2   weeks with any results.  You are being discharged to home.   You have a contact number available for emergencies.  The signs and symptoms   of potential delayed complications were discussed with you.  You may return   to normal activities tomorrow.  Written discharge instructions were   provided to you.   Resume your previous diet.   Continue your present medications.   We are waiting for your pathology results.   Your physician has recommended a repeat colonoscopy in five years for   surveillance based on pathology results.  For questions, problems or results please call your physician - Thanh De Santiago MD at Work:  [phone].  EMERGENCY PHONE NUMBER: [phone], LAB RESULTS: [phone]  IF A COMPLICATION OR EMERGENCY SITUATION ARISES AND YOU ARE UNABLE TO REACH   YOUR PHYSICIAN - GO DIRECTLY TO THE EMERGENCY ROOM.  ___________________________________________  Nurse Signature  ___________________________________________  Patient/Designated Responsible Party Signature  Thanh De Santiago MD  4/17/2024 9:24:28 AM  This report has been verified and signed electronically.  Dear patient,  As a result of recent federal legislation (The Federal Cures Act), you may   receive lab or pathology results from your procedure in your [facility]   account before your physician is able to contact you. Your physician or   their representative will relay the results to you  with their   recommendations at their soonest availability.  Thank you.  PROVATION

## 2024-04-17 NOTE — TRANSFER OF CARE
"Anesthesia Transfer of Care Note    Patient: Oneida Villalba    Procedure(s) Performed: Procedure(s) (LRB):  COLONOSCOPY (N/A)    Patient location: PACU    Anesthesia Type: general    Transport from OR: Transported from OR on room air with adequate spontaneous ventilation    Post pain: adequate analgesia    Post assessment: no apparent anesthetic complications and tolerated procedure well    Post vital signs: stable    Level of consciousness: awake, alert and oriented    Nausea/Vomiting: no nausea/vomiting    Complications: none    Transfer of care protocol was followed    Last vitals: Visit Vitals  /75 (BP Location: Right arm, Patient Position: Lying)   Pulse 68   Temp 36.7 °C (98 °F) (Skin)   Resp 18   Ht 5' 7" (1.702 m)   Wt 83.9 kg (185 lb)   LMP 04/03/2024 (Exact Date)   SpO2 99%   Breastfeeding No   BMI 28.98 kg/m²     "

## 2024-04-17 NOTE — H&P
COLONOSCOPY HISTORY AND PE    Procedure : Colonoscopy      INDICATIONS: asymptomatic screening exam      Past Medical History:   Diagnosis Date    Abnormal Pap smear of cervix     Gestational diabetes     Heart murmur        Past Surgical History:   Procedure Laterality Date     SECTION  04/22/2021    x1    EGG RETRIEVAL FOR IVF      X2    HYSTEROSCOPY         Review of patient's allergies indicates:   Allergen Reactions    Nick Anaphylaxis, Blisters, Dermatitis, Hives, Itching, Rash and Swelling    Mangosteen peel Swelling       No current facility-administered medications on file prior to encounter.     No current outpatient medications on file prior to encounter.       Family History   Problem Relation Name Age of Onset    No Known Problems Paternal Grandfather      Colon cancer Paternal Grandmother      Cancer Paternal Grandmother      No Known Problems Maternal Grandmother      No Known Problems Maternal Grandfather      No Known Problems Father      No Known Problems Mother      No Known Problems Brother      No Known Problems Sister      No Known Problems Maternal Aunt      No Known Problems Maternal Uncle      No Known Problems Paternal Aunt      No Known Problems Paternal Uncle      Amblyopia Neg Hx      Blindness Neg Hx      Cataracts Neg Hx      Glaucoma Neg Hx      Macular degeneration Neg Hx      Retinal detachment Neg Hx      Strabismus Neg Hx      Diabetes Neg Hx      Hypertension Neg Hx      Stroke Neg Hx      Thyroid disease Neg Hx      Breast cancer Neg Hx      Uterine cancer Neg Hx      Cervical cancer Neg Hx      Ovarian cancer Neg Hx         Social History     Socioeconomic History    Marital status:    Tobacco Use    Smoking status: Former     Current packs/day: 0.00     Types: Cigarettes     Quit date: 12/10/2003     Years since quittin.3    Smokeless tobacco: Never   Substance and Sexual Activity    Alcohol use: Yes    Drug use: No    Sexual activity: Yes      Partners: Male     Birth control/protection: None       Review of Systems -    Respiratory : no cough, shortness of breath, or wheezing  Cardiovascular  no chest pain or dyspnea on exertion  Gastrointestinal no abdominal pain, change in bowel habits, or black or bloody stools  Musculoskeletal no deformities, swelling  Neurological no TIA or stroke symptoms        Physical Exam:  General: NAD  AT NC EOMI  Mallampati Score   Neck supple, trachea midline  Lungs: nl excursions, no retractions.  Breathing comfortably  Abdomen ND soft NT.  No masses  Extremities: No CCE.      ASA:  II    PLAN  COLONOSCOPY.  The details of the procedure, the possible need for biopsy or polypectomy and the potential risks including bleeding, perforation, missed polyps were discussed in detail.

## 2024-04-22 ENCOUNTER — HOSPITAL ENCOUNTER (OUTPATIENT)
Dept: RADIOLOGY | Facility: HOSPITAL | Age: 44
Discharge: HOME | End: 2024-04-22
Attending: STUDENT IN AN ORGANIZED HEALTH CARE EDUCATION/TRAINING PROGRAM
Payer: COMMERCIAL

## 2024-04-22 ENCOUNTER — TELEPHONE (OUTPATIENT)
Dept: OBSTETRICS AND GYNECOLOGY | Facility: CLINIC | Age: 44
End: 2024-04-22
Payer: COMMERCIAL

## 2024-04-22 DIAGNOSIS — N92.3 INTERMENSTRUAL SPOTTING: ICD-10-CM

## 2024-04-22 PROCEDURE — 76830 TRANSVAGINAL US NON-OB: CPT | Mod: 26,,, | Performed by: RADIOLOGY

## 2024-04-22 PROCEDURE — 76830 TRANSVAGINAL US NON-OB: CPT | Mod: TC,PN

## 2024-04-22 PROCEDURE — 76856 US EXAM PELVIC COMPLETE: CPT | Mod: TC,PN

## 2024-04-22 PROCEDURE — 76856 US EXAM PELVIC COMPLETE: CPT | Mod: 26,,, | Performed by: RADIOLOGY

## 2024-04-22 NOTE — TELEPHONE ENCOUNTER
----- Message from Candie Bourne sent at 4/22/2024 12:42 PM CDT -----  Contact: PT  Type:  Patient Returning Call    Who Called:  PT  Who Left Message for Patient:  Dr. Silverman  Does the patient know what this is regarding?:  yes  Best Call Back Number:  [phone]  Additional Information:  States she was left a message via portal to call office

## 2024-04-23 ENCOUNTER — OFFICE VISIT (OUTPATIENT)
Dept: OBSTETRICS AND GYNECOLOGY | Facility: CLINIC | Age: 44
End: 2024-04-23
Payer: COMMERCIAL

## 2024-04-23 DIAGNOSIS — N92.3 INTERMENSTRUAL SPOTTING: Primary | ICD-10-CM

## 2024-04-23 LAB
FINAL PATHOLOGIC DIAGNOSIS: NORMAL
GROSS: NORMAL
Lab: NORMAL
MICROSCOPIC EXAM: NORMAL

## 2024-04-23 PROCEDURE — 99213 OFFICE O/P EST LOW 20 MIN: CPT | Mod: 95,,, | Performed by: STUDENT IN AN ORGANIZED HEALTH CARE EDUCATION/TRAINING PROGRAM

## 2024-04-23 NOTE — PROGRESS NOTES
The patient location is: home  The chief complaint leading to consultation is:  discuss US    Visit type: audiovisual    Face to Face time with patient: 10  12 minutes of total time spent on the encounter, which includes face to face time and non-face to face time preparing to see the patient (eg, review of tests), Obtaining and/or reviewing separately obtained history, Documenting clinical information in the electronic or other health record, Independently interpreting results (not separately reported) and communicating results to the patient/family/caregiver, or Care coordination (not separately reported).         Each patient to whom he or she provides medical services by telemedicine is:  (1) informed of the relationship between the physician and patient and the respective role of any other health care provider with respect to management of the patient; and (2) notified that he or she may decline to receive medical services by telemedicine and may withdraw from such care at any time.    Notes:     History & Physical  Gynecology      SUBJECTIVE:     Chief Complaint: No chief complaint on file.       History of Present Illness:    Here today to discuss US results.     TVUS:  Narrative & Impression  EXAMINATION:  US PELVIS COMP WITH TRANSVAG NON-OB (XPD)     CLINICAL HISTORY:  Ovulation bleeding     TECHNIQUE:  Transabdominal sonography of the pelvis was performed, followed by transvaginal sonography to better evaluate the uterus and ovaries.     COMPARISON:  None     FINDINGS:  Uterus: The uterus measures 8.8 x 3.7 x 4.9 cm in dimension.  There are a few small incidentally observed simple myometrial cysts present in the uterine fundus.  There is a 9 mm hypoechoic intramural fibroid present within the posterior wall of the lower uterine body.  There is a normal homogeneous uterine parenchymal echotexture.  The endometrial stripe measures 15.9 mm, borderline thickened even for a premenopausal patient.  No  fluid/blood products within the endometrial canal.  There are cervical nabothian cysts present.     Ovaries: The right ovary measures 1.9 x 2.4 x 1.3 cm in dimension and the left ovary measures 3.2 x 2.8 x 1.5 cm in dimension.  There are multiple simple follicles present in both ovaries.  There is a a 13 x 14 x 15 mm simple left ovarian follicle present.  There is a 4 mm dystrophic calcification present within the left ovary adjacent to the patient's 15 mm simple follicle.  There is a  15 x 14 mm complex/hemorrhagic degenerating dominant follicle within the left ovary.  No solid ovarian masses.  There is normal arterial and venous color flow present in both ovaries.     Adnexal soft tissues: There is a small volume of simple free fluid present within the cul de sac.  No solid or cystic adnexal masses.     Impression:     1. Endometrium measures 15.9 mm in thickness, borderline thickened for a premenopausal patient.  Endometrial hyperplasia is not excluded.  2. 9 mm hypoechoic intramural fibroid within the posterior wall of the midline lower uterine body.  3. 15 mm probable degenerating dominant follicle within the left ovary.  4. Incidentally observed simple myometrial cysts and cervical nabothian cysts.       Review of patient's allergies indicates:   Allergen Reactions    Cullen Anaphylaxis, Blisters, Dermatitis, Hives, Itching, Rash and Swelling    Mangosteen peel Swelling       Past Medical History:   Diagnosis Date    Abnormal Pap smear of cervix     Gestational diabetes     Heart murmur      Past Surgical History:   Procedure Laterality Date     SECTION  04/22/2021    x1    COLONOSCOPY N/A 2024    Procedure: COLONOSCOPY;  Surgeon: RITO De Santiago MD;  Location: [facility];  Service: Endoscopy;  Laterality: N/A;    EGG RETRIEVAL FOR IVF      X2    HYSTEROSCOPY       OB History          1    Para   1    Term   1            AB        Living   1         SAB        IAB        Ectopic         Multiple        Live Births   1               Family History   Problem Relation Name Age of Onset    No Known Problems Paternal Grandfather      Colon cancer Paternal Grandmother      Cancer Paternal Grandmother      No Known Problems Maternal Grandmother      No Known Problems Maternal Grandfather      No Known Problems Father      No Known Problems Mother      No Known Problems Brother      No Known Problems Sister      No Known Problems Maternal Aunt      No Known Problems Maternal Uncle      No Known Problems Paternal Aunt      No Known Problems Paternal Uncle      Amblyopia Neg Hx      Blindness Neg Hx      Cataracts Neg Hx      Glaucoma Neg Hx      Macular degeneration Neg Hx      Retinal detachment Neg Hx      Strabismus Neg Hx      Diabetes Neg Hx      Hypertension Neg Hx      Stroke Neg Hx      Thyroid disease Neg Hx      Breast cancer Neg Hx      Uterine cancer Neg Hx      Cervical cancer Neg Hx      Ovarian cancer Neg Hx       Social History     Tobacco Use    Smoking status: Former     Current packs/day: 0.00     Types: Cigarettes     Quit date: 12/10/2003     Years since quittin.3    Smokeless tobacco: Never   Substance Use Topics    Alcohol use: Yes    Drug use: No       No current outpatient medications on file.     No current facility-administered medications for this visit.         Review of Systems:  Review of Systems   Constitutional:  Negative for chills, fatigue and fever.   HENT:  Negative for congestion.    Eyes:  Negative for visual disturbance.   Respiratory:  Negative for cough and shortness of breath.    Cardiovascular:  Negative for chest pain and palpitations.   Gastrointestinal:  Negative for abdominal distention, abdominal pain, constipation, diarrhea, nausea and vomiting.   Genitourinary:  Negative for difficulty urinating, dysuria, hematuria, vaginal bleeding and vaginal discharge.   Skin:  Negative for rash.   Neurological:  Negative for dizziness, seizures,  light-headedness and headaches.   Hematological:  Does not bruise/bleed easily.   Psychiatric/Behavioral:  Negative for dysphoric mood. The patient is not nervous/anxious.         OBJECTIVE:     Physical Exam:  Physical Exam  Vitals reviewed.   Constitutional:       General: She is not in acute distress.     Appearance: Normal appearance. She is well-developed.   HENT:      Head: Normocephalic and atraumatic.   Cardiovascular:      Rate and Rhythm: Normal rate and regular rhythm.   Pulmonary:      Effort: Pulmonary effort is normal.   Abdominal:      General: There is no distension.      Palpations: Abdomen is soft.   Genitourinary:     Vagina: Normal.   Skin:     General: Skin is warm.   Neurological:      Mental Status: She is alert and oriented to person, place, and time.   Psychiatric:         Behavior: Behavior normal.         Thought Content: Thought content normal.         Judgment: Judgment normal.           ASSESSMENT:       ICD-10-CM ICD-9-CM    1. Intermenstrual spotting  N92.3 626.6           No orders of the defined types were placed in this encounter.          Plan:      - Discussed options including embx vs hscope D&C  - recommend hscope D&C given how thick the endometrial lining is as can be both diagnostic and therapeutic. R/b/a discussed  - opts to have embx but will schedule with her previous OBGYN in [facility].     Greta Silverman M.D.  Obstetrics and Gynecology

## 2024-05-03 NOTE — PROGRESS NOTES
Adenoma or history of polyps    Repeat colonoscopy in 5 years       If you have any questions or if I can clarify any of the above please contact me:    [employer] [phone]   Pager [phone]  email [email or web address]  Nurse Elizabeth Gaytan [phone]  :  [phone]    Sincerely  H, Thanh De Santiago MD, FACS, FASCRS  Staff Surgeon  Dept of Colon and Rectal Surgery

## 2024-09-30 ENCOUNTER — HOSPITAL ENCOUNTER (OUTPATIENT)
Dept: RADIOLOGY | Facility: HOSPITAL | Age: 44
Discharge: HOME | End: 2024-09-30
Attending: FAMILY MEDICINE
Payer: COMMERCIAL

## 2024-09-30 DIAGNOSIS — Z12.31 ENCOUNTER FOR SCREENING MAMMOGRAM FOR BREAST CANCER: ICD-10-CM

## 2024-09-30 PROCEDURE — 77067 SCR MAMMO BI INCL CAD: CPT | Mod: TC,PO

## 2024-09-30 PROCEDURE — 77063 BREAST TOMOSYNTHESIS BI: CPT | Mod: 26,,, | Performed by: RADIOLOGY

## 2024-09-30 PROCEDURE — 77067 SCR MAMMO BI INCL CAD: CPT | Mod: 26,,, | Performed by: RADIOLOGY

## 2024-10-03 ENCOUNTER — PATIENT MESSAGE (OUTPATIENT)
Dept: DERMATOLOGY | Facility: CLINIC | Age: 44
End: 2024-10-03
Payer: COMMERCIAL

## 2024-10-14 ENCOUNTER — OFFICE VISIT (OUTPATIENT)
Dept: CARDIOLOGY | Facility: CLINIC | Age: 44
End: 2024-10-14
Payer: COMMERCIAL

## 2024-10-14 VITALS — HEIGHT: 67 IN | BODY MASS INDEX: 30.17 KG/M2 | WEIGHT: 192.25 LBS

## 2024-10-14 DIAGNOSIS — R01.1 UNDIAGNOSED CARDIAC MURMURS: ICD-10-CM

## 2024-10-14 DIAGNOSIS — I49.8 OTHER SPECIFIED CARDIAC ARRHYTHMIAS: Primary | ICD-10-CM

## 2024-10-14 PROCEDURE — 3008F BODY MASS INDEX DOCD: CPT | Mod: CPTII,S$GLB,, | Performed by: INTERNAL MEDICINE

## 2024-10-14 PROCEDURE — 99204 OFFICE O/P NEW MOD 45 MIN: CPT | Mod: S$GLB,,, | Performed by: INTERNAL MEDICINE

## 2024-10-14 PROCEDURE — 99999 PR PBB SHADOW E&M-EST. PATIENT-LVL III: CPT | Mod: PBBFAC,,, | Performed by: INTERNAL MEDICINE

## 2024-10-14 PROCEDURE — 93005 ELECTROCARDIOGRAM TRACING: CPT | Mod: PO

## 2024-10-14 PROCEDURE — 1159F MED LIST DOCD IN RCRD: CPT | Mod: CPTII,S$GLB,, | Performed by: INTERNAL MEDICINE

## 2024-10-14 PROCEDURE — 1160F RVW MEDS BY RX/DR IN RCRD: CPT | Mod: CPTII,S$GLB,, | Performed by: INTERNAL MEDICINE

## 2024-10-14 PROCEDURE — 93010 ELECTROCARDIOGRAM REPORT: CPT | Mod: S$GLB,,, | Performed by: INTERNAL MEDICINE

## 2024-10-14 RX ORDER — THYROID 30 MG/1
30 TABLET ORAL
COMMUNITY

## 2024-10-14 RX ORDER — PROGESTERONE 100 MG/1
100 CAPSULE ORAL DAILY
COMMUNITY

## 2024-10-14 RX ORDER — TIRZEPATIDE 15 MG/.5ML
15 INJECTION, SOLUTION SUBCUTANEOUS
COMMUNITY

## 2024-10-14 NOTE — PROGRESS NOTES
Subjective:    Patient ID:  Oneida Villalba is a 44 y.o. female who presents for evaluation of Heart Murmur      HPI  She states that several years ago while she was in [place] doing her college years she was diagnosed with a murmur and she was told that she had to be checked out every 5-10 years.  She has not been checked in at least 5 years.    She goes to the gym 3 or 4 times a week with no problems.  No palpitations, no chest pain, no shortness of breath.    Functional class 1-2.  No major cardiac risk factors.  No cardiac medications.    No tobacco use    Review of Systems   Constitutional: Negative for decreased appetite, malaise/fatigue, weight gain and weight loss.   Cardiovascular:  Negative for chest pain, dyspnea on exertion, leg swelling, palpitations and syncope.   Respiratory:  Negative for cough and shortness of breath.    Gastrointestinal: Negative.    Neurological:  Negative for weakness.   All other systems reviewed and are negative.       Objective:      Physical Exam  Vitals and nursing note reviewed.   Constitutional:       Appearance: Normal appearance. She is well-developed.   HENT:      Head: Normocephalic.   Eyes:      Pupils: Pupils are equal, round, and reactive to light.   Neck:      Thyroid: No thyromegaly.      Vascular: No carotid bruit or JVD.   Cardiovascular:      Rate and Rhythm: Normal rate and regular rhythm.      Chest Wall: PMI is not displaced.      Pulses: Normal pulses and intact distal pulses.      Heart sounds: Murmur heard.      High-pitched blowing holosystolic murmur is present with a grade of 2/6 at the apex.      No gallop.   Pulmonary:      Effort: Pulmonary effort is normal.      Breath sounds: Normal breath sounds.   Abdominal:      Palpations: Abdomen is soft. There is no mass.      Tenderness: There is no abdominal tenderness.   Musculoskeletal:         General: Normal range of motion.      Cervical back: Normal range of motion and neck supple.   Skin:     General:  Skin is warm.   Neurological:      Mental Status: She is alert and oriented to person, place, and time.      Sensory: No sensory deficit.      Deep Tendon Reflexes: Reflexes are normal and symmetric.             Most Recent EKG Results  No results found for this or any previous visit.    Most Recent Echocardiogram Results  No results found for this or any previous visit.      Most Recent Nuclear Stress Test Results  No results found for this or any previous visit.      Most Recent Cardiac PET Stress Test Results  No results found for this or any previous visit.      Most Recent Cardiovascular Angiogram results  No results found for this or any previous visit.      Other Most Recent Cardiology Results  No results found for this or any previous visit.      Labs reviewed    Assessment:       1. Other specified cardiac arrhythmias    2. Undiagnosed cardiac murmurs         Plan:     Will get exercise stress echocardiogram with color-flow.  Call with results  Regular exercise program  Weight loss  Low cholesterol diet

## 2024-10-16 LAB
OHS QRS DURATION: 72 MS
OHS QTC CALCULATION: 429 MS

## 2024-10-21 ENCOUNTER — PATIENT MESSAGE (OUTPATIENT)
Dept: CARDIOLOGY | Facility: CLINIC | Age: 44
End: 2024-10-21
Payer: COMMERCIAL

## 2024-10-21 DIAGNOSIS — R01.1 UNDIAGNOSED CARDIAC MURMURS: Primary | ICD-10-CM

## 2024-10-21 DIAGNOSIS — I49.8 OTHER SPECIFIED CARDIAC ARRHYTHMIAS: ICD-10-CM

## 2024-10-22 ENCOUNTER — DOCUMENTATION ONLY (OUTPATIENT)
Dept: CARDIOLOGY | Facility: CLINIC | Age: 44
End: 2024-10-22
Payer: COMMERCIAL

## 2024-10-22 NOTE — PROGRESS NOTES
10/21/2024:     Peer to peer done. Denied due to lack of symptoms, no clear indication for exercise stress echocardiogram as opposed to exercise stress EKG (e.g., LBBB). Reviewing physician recommended ordering an echocardiogram and exercise stress EKG test.

## 2024-11-04 ENCOUNTER — OFFICE VISIT (OUTPATIENT)
Dept: FAMILY MEDICINE | Facility: CLINIC | Age: 44
End: 2024-11-04
Payer: COMMERCIAL

## 2024-11-04 ENCOUNTER — HOSPITAL ENCOUNTER (OUTPATIENT)
Dept: RADIOLOGY | Facility: HOSPITAL | Age: 44
Discharge: HOME | End: 2024-11-04
Attending: FAMILY MEDICINE
Payer: COMMERCIAL

## 2024-11-04 VITALS
DIASTOLIC BLOOD PRESSURE: 76 MMHG | SYSTOLIC BLOOD PRESSURE: 134 MMHG | WEIGHT: 187.38 LBS | HEIGHT: 67 IN | BODY MASS INDEX: 29.41 KG/M2 | HEART RATE: 80 BPM | OXYGEN SATURATION: 98 %

## 2024-11-04 DIAGNOSIS — M54.50 CHRONIC LEFT-SIDED LOW BACK PAIN WITHOUT SCIATICA: ICD-10-CM

## 2024-11-04 DIAGNOSIS — G89.29 CHRONIC LEFT-SIDED LOW BACK PAIN WITHOUT SCIATICA: ICD-10-CM

## 2024-11-04 DIAGNOSIS — M54.9 DORSALGIA, UNSPECIFIED: ICD-10-CM

## 2024-11-04 DIAGNOSIS — Z91.018 FOOD ALLERGY: ICD-10-CM

## 2024-11-04 DIAGNOSIS — Z00.00 WELL ADULT EXAM: Primary | ICD-10-CM

## 2024-11-04 PROCEDURE — 1159F MED LIST DOCD IN RCRD: CPT | Mod: CPTII,S$GLB,, | Performed by: FAMILY MEDICINE

## 2024-11-04 PROCEDURE — 3078F DIAST BP <80 MM HG: CPT | Mod: CPTII,S$GLB,, | Performed by: FAMILY MEDICINE

## 2024-11-04 PROCEDURE — 1160F RVW MEDS BY RX/DR IN RCRD: CPT | Mod: CPTII,S$GLB,, | Performed by: FAMILY MEDICINE

## 2024-11-04 PROCEDURE — 99396 PREV VISIT EST AGE 40-64: CPT | Mod: S$GLB,,, | Performed by: FAMILY MEDICINE

## 2024-11-04 PROCEDURE — 3008F BODY MASS INDEX DOCD: CPT | Mod: CPTII,S$GLB,, | Performed by: FAMILY MEDICINE

## 2024-11-04 PROCEDURE — 72100 X-RAY EXAM L-S SPINE 2/3 VWS: CPT | Mod: 26,,, | Performed by: RADIOLOGY

## 2024-11-04 PROCEDURE — 72100 X-RAY EXAM L-S SPINE 2/3 VWS: CPT | Mod: TC,FY,PO

## 2024-11-04 PROCEDURE — 3075F SYST BP GE 130 - 139MM HG: CPT | Mod: CPTII,S$GLB,, | Performed by: FAMILY MEDICINE

## 2024-11-04 PROCEDURE — 99999 PR PBB SHADOW E&M-EST. PATIENT-LVL V: CPT | Mod: PBBFAC,,, | Performed by: FAMILY MEDICINE

## 2024-11-04 RX ORDER — NORGESTIMATE AND ETHINYL ESTRADIOL 0.25-0.035
1 KIT ORAL
COMMUNITY
End: 2024-11-04

## 2024-11-04 RX ORDER — PROGESTERONE 200 MG/1
200 CAPSULE ORAL NIGHTLY
COMMUNITY
Start: 2024-10-28

## 2024-11-04 RX ORDER — SPIRONOLACTONE 50 MG/1
50 TABLET, FILM COATED ORAL
COMMUNITY
Start: 2024-08-23 | End: 2024-11-04

## 2024-11-04 NOTE — PROGRESS NOTES
"Subjective:       Patient ID: Oneida Villalba is a 44 y.o. female.    Chief Complaint: Annual Exam (Hip and back pain)    Patient here today for annual well adult exam.  She continues to have pain in her L hip/lower back.  Has done PT twice, no sig improvement.  She is also concerned about a possible seafood (Shrimp) allergy  Tries to eat a healthy diet.  Exercises regularly  Immunizations: Up to date  Last Lab work: 2024  Colon Ca screening: Colonoscopy 2024  Breast Ca Screening: MMG Sept 2024  Cervical Ca Screening:  PAP 2024    Obesity:  She is currently on compounded GLP therapy.  She is losing weight.  PCOS:  on GLP, Progesterone.  Hypothyroid:  Now on [place] thyroid.    Recently saw cardiology and has orders for ECHO and stress test      Review of Systems   Constitutional:  Negative for activity change, appetite change, fatigue and fever.   Respiratory:  Negative for cough, shortness of breath and wheezing.    Cardiovascular:  Negative for chest pain and palpitations.   Gastrointestinal:  Negative for abdominal pain, constipation, diarrhea and nausea.   Skin:  Negative for pallor and rash.   Neurological:  Negative for dizziness, syncope, light-headedness and headaches.   Psychiatric/Behavioral:  The patient is not nervous/anxious.        Objective:      Vitals:    11/04/24 1034   BP: 134/76   Pulse: 80   SpO2: 98%   Weight: 85 kg (187 lb 6.3 oz)   Height: 5' 7" (1.702 m)      Physical Exam  Constitutional:       General: She is not in acute distress.  Cardiovascular:      Rate and Rhythm: Normal rate and regular rhythm.      Heart sounds: Normal heart sounds. No murmur heard.  Pulmonary:      Effort: Pulmonary effort is normal. No respiratory distress.      Breath sounds: Normal breath sounds. No wheezing, rhonchi or rales.   Musculoskeletal:      Lumbar back: Tenderness present. Normal range of motion (pain with EXT/FLEX). Positive left straight leg raise test.        Back:    Skin:     General: Skin is warm and " dry.   Neurological:      General: No focal deficit present.      Mental Status: She is alert.   Psychiatric:         Mood and Affect: Mood normal.         Behavior: Behavior normal.         Thought Content: Thought content normal.         Results for orders placed or performed in visit on 10/14/24   IN OFFICE EKG 12-LEAD (to [place])    Collection Time: 10/14/24  8:43 AM   Result Value Ref Range    QRS Duration 72 ms    [facility] QTC Calculation 429 ms      Assessment:       1. Well adult exam    2. Chronic left-sided low back pain without sciatica    3. Dorsalgia, unspecified    4. Food allergy        Plan:       Well adult exam  -     CBC Auto Differential; Future; Expected date: 11/04/2024  -     Comprehensive Metabolic Panel; Future; Expected date: 11/04/2024  -     Hemoglobin A1C; Future; Expected date: 11/04/2024  -     Lipid Panel; Future; Expected date: 11/04/2024  -     TSH; Future; Expected date: 11/04/2024  -     Urinalysis, Reflex to Urine Culture Urine, Clean Catch; Future; Expected date: 11/04/2024  Continue to work on dietary improvements (decrease overall calorie intake, decrease sugar and carb intake, decrease animal protein intake)  Continue to exercise at least 30-40 minutes, 3 times per week  Immunizations were discussed and were up to date  Preventative exams were discussed and up to date   Chronic left-sided low back pain without sciatica  -     X-Ray Lumbar Spine AP And Lateral; Future; Expected date: 11/04/2024  -     MRI Lumbar Spine Without Contrast; Future; Expected date: 11/04/2024  Failed PT.  Will proceed with imaging of her back.  Dorsalgia, unspecified  -     X-Ray Lumbar Spine AP And Lateral; Future; Expected date: 11/04/2024  -     MRI Lumbar Spine Without Contrast; Future; Expected date: 11/04/2024    Food allergy  -     Ambulatory referral/consult to Allergy; Future; Expected date: 11/11/2024  Referral today for possible allergy testing        Medication List with Changes/Refills    Current Medications    PROGESTERONE (PROMETRIUM) 200 MG CAPSULE    Take 200 mg by mouth every evening.    THYROID, PORK, (ARMOUR THYROID) 30 MG TAB    Take 30 mg by mouth before breakfast.    TIRZEPATIDE (MOUNJARO) 15 MG/0.5 ML PNIJ    Inject 15 mg into the skin every 7 days.   Discontinued Medications    ESTARYLLA 0.25-35 MG-MCG PER TABLET    Take 1 tablet by mouth.    PROGESTERONE (PROMETRIUM) 100 MG CAPSULE    Take 100 mg by mouth once daily.    SPIRONOLACTONE (ALDACTONE) 50 MG TABLET    Take 50 mg by mouth.

## 2024-11-06 ENCOUNTER — LAB VISIT (OUTPATIENT)
Dept: LAB | Facility: HOSPITAL | Age: 44
End: 2024-11-06
Attending: ALLERGY & IMMUNOLOGY
Payer: COMMERCIAL

## 2024-11-06 ENCOUNTER — OFFICE VISIT (OUTPATIENT)
Dept: ALLERGY | Facility: CLINIC | Age: 44
End: 2024-11-06
Payer: COMMERCIAL

## 2024-11-06 VITALS — BODY MASS INDEX: 29.72 KG/M2 | WEIGHT: 189.38 LBS | HEIGHT: 67 IN

## 2024-11-06 DIAGNOSIS — J31.0 CHRONIC RHINITIS: ICD-10-CM

## 2024-11-06 DIAGNOSIS — R11.0 NAUSEA: Primary | ICD-10-CM

## 2024-11-06 DIAGNOSIS — Z91.018 FOOD ALLERGY: ICD-10-CM

## 2024-11-06 DIAGNOSIS — R11.0 NAUSEA: ICD-10-CM

## 2024-11-06 DIAGNOSIS — R19.7 DIARRHEA, UNSPECIFIED TYPE: ICD-10-CM

## 2024-11-06 PROCEDURE — 86003 ALLG SPEC IGE CRUDE XTRC EA: CPT | Mod: 59 | Performed by: ALLERGY & IMMUNOLOGY

## 2024-11-06 PROCEDURE — 99204 OFFICE O/P NEW MOD 45 MIN: CPT | Mod: S$GLB,,, | Performed by: ALLERGY & IMMUNOLOGY

## 2024-11-06 PROCEDURE — 3044F HG A1C LEVEL LT 7.0%: CPT | Mod: CPTII,S$GLB,, | Performed by: ALLERGY & IMMUNOLOGY

## 2024-11-06 PROCEDURE — 1160F RVW MEDS BY RX/DR IN RCRD: CPT | Mod: CPTII,S$GLB,, | Performed by: ALLERGY & IMMUNOLOGY

## 2024-11-06 PROCEDURE — 1159F MED LIST DOCD IN RCRD: CPT | Mod: CPTII,S$GLB,, | Performed by: ALLERGY & IMMUNOLOGY

## 2024-11-06 PROCEDURE — 36415 COLL VENOUS BLD VENIPUNCTURE: CPT | Mod: PO | Performed by: ALLERGY & IMMUNOLOGY

## 2024-11-06 PROCEDURE — 99999 PR PBB SHADOW E&M-EST. PATIENT-LVL III: CPT | Mod: PBBFAC,,, | Performed by: ALLERGY & IMMUNOLOGY

## 2024-11-06 PROCEDURE — 3008F BODY MASS INDEX DOCD: CPT | Mod: CPTII,S$GLB,, | Performed by: ALLERGY & IMMUNOLOGY

## 2024-11-06 PROCEDURE — 86003 ALLG SPEC IGE CRUDE XTRC EA: CPT | Performed by: ALLERGY & IMMUNOLOGY

## 2024-11-06 NOTE — PROGRESS NOTES
Subjective:       Patient ID: Oneida Villalba is a 44 y.o. female.    Chief Complaint:  Allergies      45 yo woman presents for consult from Dr Darvin Mcghee for possible food allergy. She states few months ago she made shrimp scampi with garlic. About 3-4 hours after eating she started with vomiting, cramping, diarrhea, felt terrible. She had a migraine and felt weak. It was late at night and did not turn light on so not sure if flush or swelling,not very itchy. No SOB or cough or wheeze. Did not take nay med's. Passed after about 5 hours. She has avoided shrimp and garlic since but would like to know. She does have contact reaction to dick with rash, eye and lip swell, hives so avoids. She has occ runny nose, congestion. Has had testing at [facility] in past. Does not take daily med. No asthma. No eczema. No other medical issues.         Environmental History: see history section for home environment  Review of Systems   HENT:  Positive for congestion and rhinorrhea.    Eyes:  Negative for discharge, redness and itching.   Gastrointestinal:  Positive for abdominal pain, diarrhea, nausea and vomiting. Negative for constipation.   Skin:  Positive for color change. Negative for rash.   Neurological:  Positive for weakness and headaches.        Objective:      Physical Exam  Vitals and nursing note reviewed.   Constitutional:       General: She is not in acute distress.     Appearance: Normal appearance. She is not ill-appearing.   HENT:      Nose: No rhinorrhea.   Eyes:      General:         Right eye: No discharge.         Left eye: No discharge.      Conjunctiva/sclera: Conjunctivae normal.   Pulmonary:      Effort: Pulmonary effort is normal. No respiratory distress.   Abdominal:      General: There is no distension.   Skin:     General: Skin is warm and dry.      Findings: No erythema or rash.   Neurological:      Mental Status: She is alert and oriented to person, place, and time.   Psychiatric:         Mood and  Affect: Mood normal.         Behavior: Behavior normal.         Laboratory:   none performed   Assessment:       1. Nausea    2. Food allergy    3. Diarrhea, unspecified type    4. Chronic rhinitis         Plan:       Vomiting post shrimp ingestion- advised time course is less C/w IgE mediated allergy and more likely intolerance, will send immunocaps to assess, if negative can reintroduce shrimp  Rhinitis- H 1 blocker daily as needed  Phone review    I spent a total of 45 minutes on the day of the visit.  This includes face to face time and non-face to face time preparing to see the patient (eg, review of tests), obtaining and/or reviewing separately obtained history, documenting clinical information in the electronic or other health record, independently interpreting results and communicating results to the patient/family/caregiver, or care coordinator.

## 2024-11-12 LAB
CATFISH IGE QN: <0.1 KU/L
CLAM IGE QN: 0.29 KU/L
CRAB IGE QN: 0.28 KU/L
CRAWFISH IGE QN: 0.21 KU/L
GARLIC IGE QN: <0.1 KU/L
LOBSTER IGE QN: <0.1 KU/L
ONION IGE QN: <0.1 KU/L
OYSTER IGE QN: 0.53 KU/L
RAST CLASS: ABNORMAL
RAST CLASS: NORMAL
SALMON IGE QN: <0.1 KU/L
SCALLOP IGE QN: 0.39 KU/L
SHRIMP IGE QN: <0.1 KU/L
TROUT IGE QN: <0.1 KU/L
TUNA IGE QN: <0.1 KU/L

## 2024-11-14 ENCOUNTER — PATIENT MESSAGE (OUTPATIENT)
Dept: ALLERGY | Facility: CLINIC | Age: 44
End: 2024-11-14
Payer: COMMERCIAL

## 2024-11-22 ENCOUNTER — HOSPITAL ENCOUNTER (OUTPATIENT)
Dept: RADIOLOGY | Facility: HOSPITAL | Age: 44
Discharge: HOME | End: 2024-11-22
Attending: FAMILY MEDICINE
Payer: COMMERCIAL

## 2024-11-22 DIAGNOSIS — M54.9 DORSALGIA, UNSPECIFIED: ICD-10-CM

## 2024-11-22 DIAGNOSIS — M54.50 CHRONIC LEFT-SIDED LOW BACK PAIN WITHOUT SCIATICA: ICD-10-CM

## 2024-11-22 DIAGNOSIS — G89.29 CHRONIC LEFT-SIDED LOW BACK PAIN WITHOUT SCIATICA: ICD-10-CM

## 2024-11-22 PROCEDURE — 72148 MRI LUMBAR SPINE W/O DYE: CPT | Mod: TC,PO

## 2024-11-22 PROCEDURE — 72148 MRI LUMBAR SPINE W/O DYE: CPT | Mod: 26,,, | Performed by: RADIOLOGY
